# Patient Record
Sex: MALE | Race: WHITE
[De-identification: names, ages, dates, MRNs, and addresses within clinical notes are randomized per-mention and may not be internally consistent; named-entity substitution may affect disease eponyms.]

---

## 2018-04-09 ENCOUNTER — HOSPITAL ENCOUNTER (INPATIENT)
Dept: HOSPITAL 74 - JER | Age: 78
LOS: 14 days | Discharge: HOME | DRG: 321 | End: 2018-04-23
Attending: FAMILY MEDICINE | Admitting: INTERNAL MEDICINE
Payer: MEDICARE

## 2018-04-09 VITALS — BODY MASS INDEX: 21.4 KG/M2

## 2018-04-09 DIAGNOSIS — R20.2: ICD-10-CM

## 2018-04-09 DIAGNOSIS — E11.9: ICD-10-CM

## 2018-04-09 DIAGNOSIS — N40.0: ICD-10-CM

## 2018-04-09 DIAGNOSIS — M47.12: Primary | ICD-10-CM

## 2018-04-09 DIAGNOSIS — K59.00: ICD-10-CM

## 2018-04-09 DIAGNOSIS — N39.0: ICD-10-CM

## 2018-04-09 DIAGNOSIS — Z79.84: ICD-10-CM

## 2018-04-09 DIAGNOSIS — G95.89: ICD-10-CM

## 2018-04-09 DIAGNOSIS — R33.8: ICD-10-CM

## 2018-04-09 DIAGNOSIS — G95.20: ICD-10-CM

## 2018-04-09 DIAGNOSIS — F41.9: ICD-10-CM
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
## 2018-04-09 DIAGNOSIS — I10: ICD-10-CM

## 2018-04-09 DIAGNOSIS — N99.89: ICD-10-CM

## 2018-04-09 LAB
ALBUMIN SERPL-MCNC: 4.3 G/DL (ref 3.4–5)
ALP SERPL-CCNC: 73 U/L (ref 45–117)
ALT SERPL-CCNC: 31 U/L (ref 12–78)
ANION GAP SERPL CALC-SCNC: 5 MMOL/L (ref 8–16)
APPEARANCE UR: CLEAR
AST SERPL-CCNC: 15 U/L (ref 15–37)
BASOPHILS # BLD: 0.5 % (ref 0–2)
BILIRUB SERPL-MCNC: 0.3 MG/DL (ref 0.2–1)
BILIRUB UR STRIP.AUTO-MCNC: NEGATIVE MG/DL
BUN SERPL-MCNC: 17 MG/DL (ref 7–18)
CALCIUM SERPL-MCNC: 9 MG/DL (ref 8.5–10.1)
CHLORIDE SERPL-SCNC: 106 MMOL/L (ref 98–107)
CO2 SERPL-SCNC: 26 MMOL/L (ref 21–32)
COLOR UR: YELLOW
CREAT SERPL-MCNC: 0.9 MG/DL (ref 0.7–1.3)
DEPRECATED RDW RBC AUTO: 14.8 % (ref 11.9–15.9)
EOSINOPHIL # BLD: 0.4 % (ref 0–4.5)
GLUCOSE SERPL-MCNC: 169 MG/DL (ref 74–106)
HCT VFR BLD CALC: 41.4 % (ref 35.4–49)
HGB BLD-MCNC: 14.2 GM/DL (ref 11.7–16.9)
INR BLD: 0.92 (ref 0.82–1.09)
KETONES UR QL STRIP: (no result)
LEUKOCYTE ESTERASE UR QL STRIP.AUTO: NEGATIVE
LYMPHOCYTES # BLD: 14.4 % (ref 8–40)
MCH RBC QN AUTO: 30.4 PG (ref 25.7–33.7)
MCHC RBC AUTO-ENTMCNC: 34.3 G/DL (ref 32–35.9)
MCV RBC: 88.5 FL (ref 80–96)
MONOCYTES # BLD AUTO: 5.2 % (ref 3.8–10.2)
MUCOUS THREADS URNS QL MICRO: (no result)
NEUTROPHILS # BLD: 79.5 % (ref 42.8–82.8)
NITRITE UR QL STRIP: NEGATIVE
PH UR: 5 [PH] (ref 5–8)
PLATELET # BLD AUTO: 231 K/MM3 (ref 134–434)
PMV BLD: 8.8 FL (ref 7.5–11.1)
POTASSIUM SERPLBLD-SCNC: 4.3 MMOL/L (ref 3.5–5.1)
PROT SERPL-MCNC: 7.4 G/DL (ref 6.4–8.2)
PROT UR QL STRIP: (no result)
PROT UR QL STRIP: (no result)
PT PNL PPP: 10.4 SEC (ref 9.98–11.88)
RBC # BLD AUTO: 4.68 M/MM3 (ref 4–5.6)
RBC # UR STRIP: NEGATIVE /UL
SODIUM SERPL-SCNC: 137 MMOL/L (ref 136–145)
SP GR UR: 1.03 (ref 1–1.03)
UROBILINOGEN UR STRIP-MCNC: NEGATIVE MG/DL (ref 0.2–1)
WBC # BLD AUTO: 8.3 K/MM3 (ref 4–10)

## 2018-04-09 PROCEDURE — G0378 HOSPITAL OBSERVATION PER HR: HCPCS

## 2018-04-09 NOTE — PDOC
History of Present Illness





<Gavino Weathers - Last Filed: 04/10/18 02:26>





- History of Present Illness


Initial Comments: 





04/09/18 21:46


Pt is a 76 yo M with PMHx of DM, HTN, BPH presenting with two day hx of 

numbness of the LLE from the hip down. 


Pt noticed numbness of the LLE on Saturday around 8pm which resulted in a gait 

imbalance yesterday and he dropping a cup of coffee.


This evening, while in the ED, patient noticed increased numbness of the RLE 

also starting from the hip down. No hx of trauma, no hx of back pain or 

swelling. No hx of cough, but wife has noted recent night sweats and some 

weight loss. Prior to yesterday, pt had been active and ambulating independently

, walking long distances, but had noted intermittent claudication with pain 

over both calves worsened by exertion and relieved for the most part with aleve.


Patient denies urinary or fecal incontinence, but has been having constipation 

with normal colored stool. 


There was no associated aura, seizure, syncope or fall. No fever, palpitations 

SOB. 





04/09/18 21:51


The numbness is descending and not ascending with no prior hx of infections 

suggestive of Guilliane Nobleton. 


Patient described prior burning and questionable pins and needle sensations 

which could be suggestive of diabetic poly-neuropathy, but the numbness appears 

to be sudden and not gradual. We will rule out Vit B12 deficiency and CBC, CMP, 

Vit B12. 


No hx of trauma or back swelling but will go ahead and do an Xray of the lumbar 

vertebrae. No urinary or fecal symptoms and no hx of cancer 


Anterior spinal artery disease could be sudden but motor functions may not be 

preserved. Xray and Ct lumbar spine to R/o spinal stenosis








04/10/18 12:35


Xray lumbar spine, Ct lumbar spine : 


Mild anterior wedging of L3 with anterior fusion of L2 bodies and anterior 

bridging osteophytes,minimal anterolisthesis of L5 over S1, Multilevel mild 

disc bulge slightly impinging Rt L4 nerve rootat L4-L5.


Moderate to marked degenerative central stenosis L5-S1








<Olga Galdamez I - Last Filed: 04/10/18 12:35>





- General


Chief Complaint: CVA/TIA


Stated Complaint: WEAKNESS


Time Seen by Provider: 04/09/18 18:42





Past History





<Gavino Weathers - Last Filed: 04/10/18 02:26>





- Past Medical History


COPD: No


Diabetes: Yes


HTN: Yes





- Suicide/Smoking/Psychosocial Hx


Smoking History: Never smoked


Have you smoked in the past 12 months: No


Information on smoking cessation initiated: No


Hx Alcohol Use: No


Drug/Substance Use Hx: No


Substance Use Type: None





<Olga Galdamez - Last Filed: 04/10/18 12:35>





- Past Medical History


Allergies/Adverse Reactions: 


 Allergies











Allergy/AdvReac Type Severity Reaction Status Date / Time


 


No Known Allergies Allergy   Verified 04/09/18 16:26











Home Medications: 


Ambulatory Orders





Doxazosin Mesylate [Cardura -] 8 mg PO HS 04/10/18 


Lisinopril 5 mg PO DAILY 04/10/18 


Sitagliptin Phos/Metformin HCl [Janumet 50-1,000 mg Tablet] 1 each PO DAILY 04/

10/18 











*Physical Exam





- Vital Signs


 Last Vital Signs











Temp Pulse Resp BP Pulse Ox


 


 98.0 F   91 H  18   190/88   100 


 


 04/09/18 16:27  04/09/18 16:27  04/09/18 16:27  04/09/18 16:27  04/10/18 00:39














<Jasiel,Gavino - Last Filed: 04/10/18 02:26>





- Vital Signs


 





- Physical Exam


General Appearance: Yes: Appropriately Dressed.  No: Apparent Distress


HEENT: positive: EOMI, MARLEN.  negative: Photophobia, Sinus Tenderness, Hearing 

Decreased


Neck: positive: Supple.  negative: Decreased range of motion


Respiratory/Chest: positive: Lungs Clear, Normal Breath Sounds.  negative: 

Rhonchi, Stridor, Wheezing


Cardiovascular: positive: Regular Rate, S1, S2.  negative: Edema


Vascular Pulses: Dorsalis-Pedis (R): 2+, Doralis-Pedis (L): 2+


Gastrointestinal/Abdominal: positive: Normal Bowel Sounds, Soft.  negative: 

Tender, Tenderness


Musculoskeletal: negative: CVA Tenderness, Vertebral Tenderness


Extremity: positive: Normal Capillary Refill, Normal Inspection.  negative: 

Tender, Coldness, Swelling


Integumentary: positive: Dry, Warm.  negative: Cyanotic


Neurologic: positive: Fully Oriented, Alert, Normal Mood/Affect, Motor Strength 

5/5 (Bilateral lower extremities), Numbness, Sensory Deficit (More on LLE ).  

negative: Babinski


Deep Tendon Reflexes: Ankle (L): 2+, Ankle (R): 2+, Knee (L): 4+ (Hyperreflexia)

, Knee (R): 4+ (Hyperreflexia), Bicep (L): 2+, Bicep (R): 2+, Tricep (L): 2+, 

Tricep (R): 2+





<Olga Galdamez I - Last Filed: 04/10/18 12:35>





ED Treatment Course





- LABORATORY


CBC & Chemistry Diagram: 


 04/09/18 19:15





 04/09/18 19:15





- ADDITIONAL ORDERS


Additional order review: 


 Laboratory  Results











  04/09/18 04/09/18 04/09/18





  19:15 19:15 18:38


 


PT with INR   10.40 


 


INR   0.92 


 


Sodium  137  


 


Potassium  4.3  


 


Chloride  106  


 


Carbon Dioxide  26  


 


Anion Gap  5 L  


 


BUN  17  


 


Creatinine  0.9  


 


Creat Clearance w eGFR  > 60  


 


Random Glucose  169 H  


 


Calcium  9.0  


 


Total Bilirubin  0.3  


 


AST  15  


 


ALT  31  


 


Alkaline Phosphatase  73  


 


Total Protein  7.4  


 


Albumin  4.3  


 


Urine Color    Yellow


 


Urine Appearance    Clear


 


Urine pH    5.0


 


Ur Specific Gravity    1.026


 


Urine Protein    1+ H


 


Urine Glucose (UA)    1+ H


 


Urine Ketones    Trace H


 


Urine Blood    Negative


 


Urine Nitrite    Negative


 


Urine Bilirubin    Negative


 


Urine Urobilinogen    Negative


 


Ur Leukocyte Esterase    Negative


 


Urine WBC (Auto)    <1


 


Urine RBC (Auto)    <1


 


Urine Mucus    Rare








 











  04/09/18





  19:15


 


RBC  4.68


 


MCV  88.5


 


MCHC  34.3


 


RDW  14.8


 


MPV  8.8


 


Neutrophils %  79.5


 


Lymphocytes %  14.4


 


Monocytes %  5.2


 


Eosinophils %  0.4


 


Basophils %  0.5














- RADIOLOGY


Radiology Studies Ordered: 














 Category Date Time Status


 


 LUMBAR SPINE CT W/O CONTRAST [CT] Stat CT Scan  04/10/18 02:24 Ordered














<Gavino Weathers - Last Filed: 04/10/18 02:26>





- LABORATORY


CBC & Chemistry Diagram: 


 04/09/18 19:15





 04/09/18 19:15





- ADDITIONAL ORDERS


Additional order review: 


 Laboratory  Results











  04/09/18 04/09/18 04/09/18





  19:15 19:15 18:38


 


PT with INR   10.40 


 


INR   0.92 


 


Sodium  137  


 


Potassium  4.3  


 


Chloride  106  


 


Carbon Dioxide  26  


 


Anion Gap  5 L  


 


BUN  17  


 


Creatinine  0.9  


 


Creat Clearance w eGFR  > 60  


 


Random Glucose  169 H  


 


Calcium  9.0  


 


Total Bilirubin  0.3  


 


AST  15  


 


ALT  31  


 


Alkaline Phosphatase  73  


 


Total Protein  7.4  


 


Albumin  4.3  


 


Urine Color    Yellow


 


Urine Appearance    Clear


 


Urine pH    5.0


 


Ur Specific Gravity    1.026


 


Urine Protein    1+ H


 


Urine Glucose (UA)    1+ H


 


Urine Ketones    Trace H


 


Urine Blood    Negative


 


Urine Nitrite    Negative


 


Urine Bilirubin    Negative


 


Urine Urobilinogen    Negative


 


Ur Leukocyte Esterase    Negative


 


Urine WBC (Auto)    <1


 


Urine RBC (Auto)    <1


 


Urine Mucus    Rare








 











  04/09/18





  19:15


 


RBC  4.68


 


MCV  88.5


 


MCHC  34.3


 


RDW  14.8


 


MPV  8.8


 


Neutrophils %  79.5


 


Lymphocytes %  14.4


 


Monocytes %  5.2


 


Eosinophils %  0.4


 


Basophils %  0.5














<Olga Galdamez I - Last Filed: 04/10/18 12:35>





*DC/Admit/Observation/Transfer





- Discharge Dispostion


Admit: Yes





<Gavino Weathers - Last Filed: 04/10/18 02:26>





<Olga Galdamez - Last Filed: 04/10/18 12:35>


Diagnosis at time of Disposition: 


 Bilateral leg paresthesia, Spinal stenosis








- Discharge Dispostion


Condition at time of disposition: Stable

## 2018-04-09 NOTE — PDOC
Attending Attestation





- Resident


Resident Name: DeniceOlga I





- ED Attending Attestation


I have performed the following: I have examined & evaluated the patient, The 

case was reviewed & discussed with the resident, I agree w/resident's findings 

& plan, Exceptions are as noted





- HPI


HPI: 





04/09/18 22:00


77y M hx dm, htn, bph, presents with 2 days leg weakness. Pt states ender ton 

saturday night, he had sudden onset of tingling/parasthesias in the LLE, with 

some in the RLE, and worsened the past 2 day where he can barely ambulate. Th 

ept denies any focal weakness, headache, dizziness, back pain, trauma, visoin 

changes, upper extermitie symptoms, fever/chills, urinary/bowel incontinence, 

recent illness/uris. He denies any chest pain, palpitations, lightheadedness. 





On exam the patient is well-appearing, in no acute distress,


GENERAL: The patient is awake, alert, and fully oriented, Nontoxic - in no 

acute distress.


HEAD: Normocephalic, atraumatic.


EYES: extraocular movements intact, sclera anicteric, conjunctiva clear.


ENT: Normal voice,  Moist mucous membranes.


NECK: Normal range of motion, supple


LUNGS: Breath sounds equal, clear to auscultation bilaterally.  No wheezes, no 

rhonchi, no rales.


HEART: Regular rate and rhythm, normal S1 and S2 without murmur, rub or gallop.


ABDOMEN: Soft, nontender, normoactive bowel sounds.  No guarding, no rebound.  

. No CVA tenderness


EXTREMITIES: Normal range of motion, no edema.  No clubbing or cyanosis. No 

cords, erythema, or tenderness.


NEUROLOGICAL: No facial assymetry, Normal speech, 


PSYCH: Normal mood, normal affect.


SKIN: Warm, Dry, normal turgor,





NEURO:


Mental status: The patient is oriented x3.


Cranial nerves: Cranial nerves II through XII are intact


Motor: Strength in the upper and lower extremities are symmetric and intact, 5/5


Sensation: Sensation is deminished in the lower extremities to light touch  +

romberg negative


Cerebellar: Finger-finger-nose is normal in both upper extremities. rapid 

alternating movements are normal.


Reflexes: hyperreflex patellar reflexes


Gait: unstable gait





ddx - cva, canal steonsis/nerve impingement, 


no back pain or history of trauma


will ck ct head, lumbar xray


enzo ck labs


will reassess














- Physicial Exam


PE: 





04/10/18 02:09


see above








- Medical Decision Making





04/10/18 02:08


labs unremarkble


ct head negative for acute patholgoy 


Lumbar spine xray reviewed - will obtain LS CT


will observe for further mangament and neuro consultation due to difficulty 

ambuliating and parasthesias





PMD is dr. Barrie hess - will admit to hospitalist service overnight





case dw dr. camacho


agreed with admission for furthe anagement





Case discussed in detail with admitting physician including history, physical 

exam and ancillary studies.


Admitting physician has assumed care for the patient, will follow all pending 

diagnostics and will complete the evaluation and treatment. 











**Heart Score/ECG Review





- ECG Impressions


Comment:: 





04/10/18 00:23


Twelve-lead EKG was performed and reviewed by me. 


There is normal sinus rhythm with a normal rate. 


Rate of 75


The axis is normal. 


The intervals are normal. 


There is normal R wave progression


There are no ST or T wave abnormalities.

## 2018-04-09 NOTE — PDOC
History of Present Illness





- General


Chief Complaint: CVA/TIA


Stated Complaint: WEAKNESS


Time Seen by Provider: 04/09/18 18:42





- History of Present Illness


Initial Comments: 





04/09/18 21:44


The patient is a 78 yo M with PMHx of DM, HTN, BPH








Past History





- Past Medical History


Allergies/Adverse Reactions: 


 Allergies











Allergy/AdvReac Type Severity Reaction Status Date / Time


 


No Known Allergies Allergy   Verified 04/09/18 16:26











COPD: No


Diabetes: Yes


HTN: Yes





- Suicide/Smoking/Psychosocial Hx


Smoking History: Never smoked


Have you smoked in the past 12 months: No


Information on smoking cessation initiated: No


Hx Alcohol Use: No


Drug/Substance Use Hx: No


Substance Use Type: None





*Physical Exam





- Vital Signs


 Last Vital Signs











Temp Pulse Resp BP Pulse Ox


 


 98.0 F   91 H  18   190/88   100 


 


 04/09/18 16:27  04/09/18 16:27  04/09/18 16:27  04/09/18 16:27  04/09/18 16:27














**Discharge Disposition





- Referrals


Referrals: 


Bassam Eric MD [Primary Care Provider] - 





- Patient Instructions





- Post Discharge Activity

## 2018-04-10 RX ADMIN — INSULIN ASPART SCH: 100 INJECTION, SOLUTION INTRAVENOUS; SUBCUTANEOUS at 06:35

## 2018-04-10 RX ADMIN — LISINOPRIL SCH MG: 5 TABLET ORAL at 09:14

## 2018-04-10 RX ADMIN — HEPARIN SODIUM SCH UNIT: 5000 INJECTION, SOLUTION INTRAVENOUS; SUBCUTANEOUS at 21:38

## 2018-04-10 RX ADMIN — DOXAZOSIN MESYLATE SCH MG: 4 TABLET ORAL at 22:25

## 2018-04-10 RX ADMIN — INSULIN ASPART SCH UNITS: 100 INJECTION, SOLUTION INTRAVENOUS; SUBCUTANEOUS at 12:00

## 2018-04-10 RX ADMIN — INSULIN ASPART SCH: 100 INJECTION, SOLUTION INTRAVENOUS; SUBCUTANEOUS at 16:45

## 2018-04-10 RX ADMIN — INSULIN ASPART SCH: 100 INJECTION, SOLUTION INTRAVENOUS; SUBCUTANEOUS at 21:38

## 2018-04-10 RX ADMIN — POLYETHYLENE GLYCOL 3350 SCH GM: 17 POWDER, FOR SOLUTION ORAL at 09:14

## 2018-04-10 NOTE — CON.NEURO
Consult





- Alcohol/Substance Use


Hx Alcohol Use: No





- Smoking History


Smoking history: Never smoked


Have you smoked in the past 12 months: No





Home Medications





- Allergies


Allergies/Adverse Reactions: 


 Allergies











Allergy/AdvReac Type Severity Reaction Status Date / Time


 


No Known Allergies Allergy   Verified 18 16:26














- Home Medications


Home Medications: 


Ambulatory Orders





Doxazosin Mesylate [Cardura -] 8 mg PO HS 04/10/18 


Lisinopril 5 mg PO DAILY 04/10/18 


Sitagliptin Phos/Metformin HCl [Janumet 50-1,000 mg Tablet] 1 each PO DAILY 04/

10/18 











Physical Exam-Neuro


Vital Signs: 


 Vital Signs











Temperature  97.7 F   04/10/18 07:50


 


Pulse Rate  80   04/10/18 11:00


 


Respiratory Rate  20   04/10/18 11:00


 


Blood Pressure  164/86   04/10/18 11:00


 


O2 Sat by Pulse Oximetry (%)  98   04/10/18 07:17











Labs: 


 CBC, BMP





 18 19:15 





 18 19:15 





 INR, PTT











INR  0.92  (0.82-1.09)   18  19:15    














Assessment/Plan


cc complaining of numbness , weakness in leg and pain in legs


HPI 77 year old male , History of DM, HTN BPH complaining of numbness in lower 

extremity for three days. Before saturday he was fine. He denies any 

significant bakc pain. He denies any trauma, fever or cancer.He has been 

constipated but no incontinence.


Patient has ct of l spine done and it shwed DJD, ct head is normal.


Past Medical History 


HTN, DM, BPH


No surgical History


Denies toxic habits, lives with wife, retired


NKDA





Family History:


mother  age 75, gastroenteritis/dehydration


father  age 75, CVA


brother  age 65, comps of DM





HOME MEDICATIONS:


 3





 Medication  Instructions  Recorded


 


Doxazosin Mesylate [Cardura -] 8 mg PO HS 04/10/18


 


Lisinopril 5 mg PO DAILY 04/10/18


 


Sitagliptin Phos/Metformin HCl 1 each PO DAILY 04/10/18





[Janumet 50-1,000 mg Tablet]  











Neurological Examination





Alert oriented x 3, speech is normal, 


eomi, pupils is reactive, and no face asymmetry


there is normal strength, 


Lower extremity, Bilateral hip flexor, extensor, knee flexor and extensor, 

ankle dorsiflexion and extension are normal





There is hyper reflexia in lower extremity upto grade 3


planter is withdrawal


sensation is diminished to fine touch and pinprick diminished upto t 10 level





There is tenderness distally in his feet, no swelling were seen








Assessment- Given he has diminished sensation upto t 10-12, and hyper reflexia, 

I suggest to do mri of l and t spine, and do neuropathy work up





Plan- b12,folate tsh , seruk protein electroporesis,  esr and mojgan


mri of L and T spine


PT


will follow up after the test


Thanking you so much


Yogesh Rosas MD

## 2018-04-10 NOTE — EKG
Test Reason : 

Blood Pressure : ***/*** mmHG

Vent. Rate : 075 BPM     Atrial Rate : 075 BPM

   P-R Int : 132 ms          QRS Dur : 112 ms

    QT Int : 350 ms       P-R-T Axes : 063 071 071 degrees

   QTc Int : 390 ms

 

NORMAL SINUS RHYTHM

POSSIBLE LEFT ATRIAL ENLARGEMENT

BORDERLINE ECG

NO PREVIOUS ECGS AVAILABLE

Confirmed by MD Ward, Chance ([id number]) on 4/10/2018 1:35:04 PM

 

Referred By:             Confirmed By:Chance Hopper MD

## 2018-04-10 NOTE — HP
CHIEF COMPLAINT: leg numbness





PCP: CHARISMA Eric





HISTORY OF PRESENT ILLNESS:


This is a 77 year old male with a past medical history of DM, HTN, BPH 

presented to the ED with sudden onset left leg numbness 2 days ago and right 

leg numbness since sitting in the ED this evening. He has difficulty walking 

due to weakness. Prior to Saturday he was feeling fine. Denies bowel or bladder 

incontinence. Reports chronic constipation





ER course was notable for:


(1) CT head with no acute findings


(2) CT Lumbar spine with multiple bulging discs and facet joint arthrosis with 

mild B/L neural foraminal narrowing L3-4, L4-L5


(3) labs WNL





Recent Travel:


pt denies





PAST MEDICAL HISTORY:


HTN, DM, BPH


PAST SURGICAL HISTORY:


pt denies





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 




Smoking: pt denies


Alcohol: pt denies


Drugs:  pt denies





Family History:


mother  age 75, gastroenteritis/dehydration


father  age 75, CVA


brother  age 65, comps of DM





Allergies


No Known Allergies Allergy (Verified 18 16:26)





HOME MEDICATIONS:


 3





 Medication  Instructions  Recorded


 


Doxazosin Mesylate [Cardura -] 8 mg PO HS 04/10/18


 


Lisinopril 5 mg PO DAILY 04/10/18


 


Sitagliptin Phos/Metformin HCl 1 each PO DAILY 04/10/18





[Janumet 50-1,000 mg Tablet]  








REVIEW OF SYSTEMS


CONSTITUTIONAL: 


Absent:  fever, chills, diaphoresis, generalized weakness, malaise, loss of 

appetite, weight change


HEENT: 


Absent:  rhinorrhea, nasal congestion, throat pain, throat swelling, difficulty 

swallowing, mouth swelling, ear pain, eye pain, visual changes


CARDIOVASCULAR: 


Absent: chest pain, syncope, palpitations, irregular heart rate, lightheadedness

, peripheral edema


RESPIRATORY: 


Absent: cough, shortness of breath, dyspnea with exertion, orthopnea, wheezing, 

stridor, hemoptysis


GASTROINTESTINAL:


Absent: abdominal pain, abdominal distension, nausea, vomiting, diarrhea, 

constipation, melena, hematochezia


GENITOURINARY: 


Absent: dysuria, frequency, urgency, hesitancy, hematuria, flank pain, genital 

pain


MUSCULOSKELETAL: 


Absent: myalgia, arthralgia, joint swelling, back pain, neck pain


SKIN: 


Absent: rash, itching, pallor


HEMATOLOGIC/IMMUNOLOGIC: 


Absent: easy bleeding, easy bruising, lymphadenopathy, frequent infections


ENDOCRINE:


Absent: unexplained weight gain, unexplained weight loss, heat intolerance, 

cold intolerance


NEUROLOGIC: Present: focal weakness or paresthesias


Absent: headache, dizziness, unsteady gait, seizure, mental status changes, 

bladder or bowel incontinence


PSYCHIATRIC: 


Absent: anxiety, depression, suicidal or homicidal ideation, hallucinations.








PHYSICAL EXAMINATION


Vital Signs - 24 hr





 3





  04/09/18 04/10/18





  16:27 00:39


 


Temperature 98.0 F 


 


Pulse Rate 91 H 


 


Respiratory 18 





Rate  


 


Blood Pressure 190/88 


 


O2 Sat by Pulse 100 100





Oximetry (%)  








GENERAL: Awake, alert, and fully oriented, in no acute distress.


HEAD: Normal with no signs of trauma.


EYES: Pupils equal, round and reactive to light, extraocular movements intact, 

sclera anicteric, conjunctiva clear. No lid lag.


EARS, NOSE, THROAT: Ears normal, nares patent, oropharynx clear without 

exudates. Moist mucous membranes.


NECK: Normal range of motion, supple without lymphadenopathy, JVD, or masses.


LUNGS: Breath sounds equal, clear to auscultation bilaterally. No wheezes, and 

no crackles. No accessory muscle use.


HEART: Regular rate and rhythm, normal S1 and S2 without murmur, rub or gallop.


ABDOMEN: Soft, nontender, not distended, normoactive bowel sounds, no guarding, 

no rebound, no masses.  No hepatomegaly or  splenomegaly. 


MUSCULOSKELETAL: Normal range of motion at all joints. No bony deformities or 

tenderness. No CVA tenderness.


UPPER EXTREMITIES: 2+ pulses, warm, well-perfused. No cyanosis. No clubbing. No 

peripheral edema.


LOWER EXTREMITIES: 2+ pulses, warm, well-perfused. No calf tenderness. No 

peripheral edema. 


NEUROLOGICAL:  Cranial nerves II-XII intact. Normal speech. upper extremities 

WNL, finger to nose WNL, strength 5/5. bilat lower extremities noted with 

spasticity, hyperreflexia, severe ataxia on heel shin. Sharp sensation grossly 

intact, diminished light touch sensation from thighs down.


PSYCHIATRIC: Cooperative. Good eye contact. Appropriate mood and affect.


SKIN: Warm, dry, normal turgor, no rashes or lesions noted, normal capillary 

refill. 


 


Laboratory Results - last 24 hr





 3





  18





  18:38 19:15 19:15


 


WBC   8.3 


 


RBC   4.68 


 


Hgb   14.2 


 


Hct   41.4 


 


MCV   88.5 


 


MCH   30.4 


 


MCHC   34.3 


 


RDW   14.8 


 


Plt Count   231 


 


MPV   8.8 


 


Neutrophils %   79.5 


 


Lymphocytes %   14.4 


 


Monocytes %   5.2 


 


Eosinophils %   0.4 


 


Basophils %   0.5 


 


PT with INR    10.40


 


INR    0.92


 


Sodium   


 


Potassium   


 


Chloride   


 


Carbon Dioxide   


 


Anion Gap   


 


BUN   


 


Creatinine   


 


Creat Clearance w eGFR   


 


Random Glucose   


 


Calcium   


 


Total Bilirubin   


 


AST   


 


ALT   


 


Alkaline Phosphatase   


 


Total Protein   


 


Albumin   


 


Vitamin B12   


 


Urine Color  Yellow  


 


Urine Appearance  Clear  


 


Urine pH  5.0  


 


Ur Specific Gravity  1.026  


 


Urine Protein  1+ H  


 


Urine Glucose (UA)  1+ H  


 


Urine Ketones  Trace H  


 


Urine Blood  Negative  


 


Urine Nitrite  Negative  


 


Urine Bilirubin  Negative  


 


Urine Urobilinogen  Negative  


 


Ur Leukocyte Esterase  Negative  


 


Urine WBC (Auto)  <1  


 


Urine RBC (Auto)  <1  


 


Urine Mucus  Rare  








 3





  18 04/10/18





  19:15 00:00


 


WBC  


 


RBC  


 


Hgb  


 


Hct  


 


MCV  


 


MCH  


 


MCHC  


 


RDW  


 


Plt Count  


 


MPV  


 


Neutrophils %  


 


Lymphocytes %  


 


Monocytes %  


 


Eosinophils %  


 


Basophils %  


 


PT with INR  


 


INR  


 


Sodium  137 


 


Potassium  4.3 


 


Chloride  106 


 


Carbon Dioxide  26 


 


Anion Gap  5 L 


 


BUN  17 


 


Creatinine  0.9 


 


Creat Clearance w eGFR  > 60 


 


Random Glucose  169 H 


 


Calcium  9.0 


 


Total Bilirubin  0.3 


 


AST  15 


 


ALT  31 


 


Alkaline Phosphatase  73 


 


Total Protein  7.4 


 


Albumin  4.3 


 


Vitamin B12   529


 


Urine Color  


 


Urine Appearance  


 


Urine pH  


 


Ur Specific Gravity  


 


Urine Protein  


 


Urine Glucose (UA)  


 


Urine Ketones  


 


Urine Blood  


 


Urine Nitrite  


 


Urine Bilirubin  


 


Urine Urobilinogen  


 


Ur Leukocyte Esterase  


 


Urine WBC (Auto)  


 


Urine RBC (Auto)  


 


Urine Mucus  








ECG 


normal sinus rhythm


vent rate 75, 


no acute ST/T wave changes





Radiology Reports


CT head non contrast


THIS IS A PRELIMINARY REPORT FROM IMAGING ON CALL


IMPRESSION: No evidence of pathology.


THIS DOCUMENT HAS BEEN ELECTRONICALLY SIGNED


Keon Chris MD


04/10/2018 01:48 EST





CT LS spine


THIS IS A PRELIMINARY REPORT FROM IMAGING ON CALL


IMPRESSION: Multilevel moderate degenerative changes with mild bilateral neural 

foraminal


narrowing at the L3-4 and L4-5 levels.


THIS DOCUMENT HAS BEEN ELECTRONICALLY SIGNED


Keon Chris MD


04/10/2018 03:05 EST








ASSESSMENT/PLAN:


77yM with PMH HTN, DM, BPH presented to the ED with B/L LE numbness, weakness 

and spasticity.





Ataxia, B/L LE parasthesias, r/o Upper motor neuron disorder


   - LS spine CT unimpressive given severity of findings on PE


   - Neuro consult


   - would consider MRI thoracic and lumbar spine.





constipation


   - large amount of fecal matter visible on L spine xray


   - start miralax, monitor efficacy.





HTN


   - cont home lisinopril, monitor BP and adjust accordingly





DM


   - home janumet held


   - BGM AC/HS with novolog SS


   - diabetic diet





BPH


   - cont home cardura





DVT PPX


   - heparin deferred for now, anticipated LOS <48h, reassess if stays longer





FEN


   - tolerating po


   - BMP in am 4/11


   - low sodium/diabetic diet as tolerated





Dispo: Pt currently requires further observation and management of his emergent 

condition.








Visit type





- Emergency Visit


Emergency Visit: Yes


ED Registration Date: 04/10/18


Care time: The patient presented to the Emergency Department on the above date 

and was hospitalized for further evaluation of their emergent condition.





- New Patient


This patient is new to me today: Yes


Date on this admission: 04/10/18





- Critical Care


Critical Care patient: No





Hospitalist Screening





- Colonoscopy Questionnaire


Colonoscopy Questionnaire: 





Colonoscopy Questionnaire








-   Patient:


50 - 75 years old and never had a screening colonoscopy: No


History of colon or rectal polyps, or CA: No


History of IBD, Crohn's disease or UC: No


History of abdominal radiation therapy as a child: No





-   Relative:


1 with colon or rectal CA, or polyps at age 60 or younger: No


Colon or rectal CA diagnosed at age 45 or younger: No


Multiple relatives with colon or rectal CA: No





-   Outcome:


Screening Result: Negative Screen

## 2018-04-10 NOTE — PN
Progress Note, Physician


Chief Complaint: 





BLLE weakness


History of Present Illness: 





NAD, in bed, family at bedside





- Current Medication List


Current Medications: 


Active Medications





Doxazosin Mesylate (Cardura -)  8 mg PO HS [facility]


Insulin Aspart (Novolog Vial Sliding Scale -)  0 vial SQ HS [facility]


   PRN Reason: Protocol


Insulin Aspart (Novolog Vial Sliding Scale -)  0 vial SQ TIDAC [facility]


   PRN Reason: Protocol


   Last Admin: 04/10/18 06:35 Dose:  Not Given


Lisinopril (Prinivil)  5 mg PO DAILY [facility]


   Last Admin: 04/10/18 09:14 Dose:  5 mg


Metformin HCl (Glucophage -)  1,000 mg PO DAILY@0700 [facility]


   Last Admin: 04/10/18 06:35 Dose:  1,000 mg


Polyethylene Glycol (Miralax (For Daily Use) -)  17 gm PO DAILY [facility]


   Last Admin: 04/10/18 09:14 Dose:  17 gm


Sitagliptin Phosphate (Januvia -)  50 mg PO DAILY@0700 [facility]


   Last Admin: 04/10/18 06:35 Dose:  50 mg











- Objective


Vital Signs: 


 Vital Signs











Temperature  97.7 F   04/10/18 07:50


 


Pulse Rate  82   04/10/18 07:50


 


Respiratory Rate  18   04/10/18 07:50


 


Blood Pressure  149/87   04/10/18 07:50


 


O2 Sat by Pulse Oximetry (%)  98   04/10/18 07:17











Constitutional: Yes: Well Nourished, No Distress, Calm


Cardiovascular: Yes: Regular Rate and Rhythm


Respiratory: Yes: Regular


Gastrointestinal: Yes: Normal Bowel Sounds, Soft


Musculoskeletal: Yes: Muscle Weakness (BLLE)


Edema: No


Peripheral Pulses WNL: Yes


Neurological: Yes: Alert, Oriented, Numbness (BLLE), Tingling (BLLE), Weakness


Psychiatric: Yes: Alert, Oriented


Labs: 


 CBC, BMP





 04/09/18 19:15 





 04/09/18 19:15 





 INR, PTT











INR  0.92  (0.82-1.09)   04/09/18  19:15    














Problem List





- Problems


(1) Diabetes


Assessment/Plan: 


-diabetic diet


-hold metformin during hospital stay


-continue Januvia


-BGM


-A1c at 8.0


Code(s): E11.9 - TYPE 2 DIABETES MELLITUS WITHOUT COMPLICATIONS   





(2) Bilateral leg paresthesia


Assessment/Plan: 


-neurology consult


-CT lumbar spine


-MRI Spine ?


-Physical therapy


-check folate, esr, mojgan,


-B12 and TSH normal


Code(s): R20.2 - PARESTHESIA OF SKIN   





Assessment/Plan





see problem list


DVT prophylaxis

## 2018-04-11 LAB
ALBUMIN SERPL-MCNC: 3.8 G/DL (ref 3.4–5)
ALP SERPL-CCNC: 61 U/L (ref 45–117)
ALT SERPL-CCNC: 23 U/L (ref 12–78)
ANION GAP SERPL CALC-SCNC: 10 MMOL/L (ref 8–16)
AST SERPL-CCNC: 17 U/L (ref 15–37)
BASOPHILS # BLD: 0.7 % (ref 0–2)
BILIRUB SERPL-MCNC: 0.8 MG/DL (ref 0.2–1)
BUN SERPL-MCNC: 15 MG/DL (ref 7–18)
CALCIUM SERPL-MCNC: 9.1 MG/DL (ref 8.5–10.1)
CHLORIDE SERPL-SCNC: 103 MMOL/L (ref 98–107)
CO2 SERPL-SCNC: 25 MMOL/L (ref 21–32)
CREAT SERPL-MCNC: 0.8 MG/DL (ref 0.7–1.3)
DEPRECATED RDW RBC AUTO: 14.9 % (ref 11.9–15.9)
EOSINOPHIL # BLD: 1.4 % (ref 0–4.5)
GLUCOSE SERPL-MCNC: 136 MG/DL (ref 74–106)
HCT VFR BLD CALC: 42.1 % (ref 35.4–49)
HGB BLD-MCNC: 14.4 GM/DL (ref 11.7–16.9)
LYMPHOCYTES # BLD: 27.6 % (ref 8–40)
MAGNESIUM SERPL-MCNC: 2 MG/DL (ref 1.8–2.4)
MCH RBC QN AUTO: 30.2 PG (ref 25.7–33.7)
MCHC RBC AUTO-ENTMCNC: 34.1 G/DL (ref 32–35.9)
MCV RBC: 88.4 FL (ref 80–96)
MONOCYTES # BLD AUTO: 11 % (ref 3.8–10.2)
NEUTROPHILS # BLD: 59.3 % (ref 42.8–82.8)
PHOSPHATE SERPL-MCNC: 3 MG/DL (ref 2.5–4.9)
PLATELET # BLD AUTO: 223 K/MM3 (ref 134–434)
PMV BLD: 8.9 FL (ref 7.5–11.1)
POTASSIUM SERPLBLD-SCNC: 4.2 MMOL/L (ref 3.5–5.1)
PROT SERPL-MCNC: 7 G/DL (ref 6.4–8.2)
RBC # BLD AUTO: 4.77 M/MM3 (ref 4–5.6)
SODIUM SERPL-SCNC: 138 MMOL/L (ref 136–145)
WBC # BLD AUTO: 5.2 K/MM3 (ref 4–10)

## 2018-04-11 RX ADMIN — HEPARIN SODIUM SCH UNIT: 5000 INJECTION, SOLUTION INTRAVENOUS; SUBCUTANEOUS at 21:16

## 2018-04-11 RX ADMIN — DOXAZOSIN MESYLATE SCH MG: 4 TABLET ORAL at 21:13

## 2018-04-11 RX ADMIN — LISINOPRIL SCH MG: 5 TABLET ORAL at 09:40

## 2018-04-11 RX ADMIN — INSULIN ASPART SCH: 100 INJECTION, SOLUTION INTRAVENOUS; SUBCUTANEOUS at 21:13

## 2018-04-11 RX ADMIN — POLYETHYLENE GLYCOL 3350 SCH GM: 17 POWDER, FOR SOLUTION ORAL at 09:41

## 2018-04-11 RX ADMIN — INSULIN ASPART SCH: 100 INJECTION, SOLUTION INTRAVENOUS; SUBCUTANEOUS at 06:18

## 2018-04-11 RX ADMIN — INSULIN ASPART SCH UNITS: 100 INJECTION, SOLUTION INTRAVENOUS; SUBCUTANEOUS at 16:58

## 2018-04-11 RX ADMIN — HEPARIN SODIUM SCH UNIT: 5000 INJECTION, SOLUTION INTRAVENOUS; SUBCUTANEOUS at 09:40

## 2018-04-11 RX ADMIN — INSULIN ASPART SCH: 100 INJECTION, SOLUTION INTRAVENOUS; SUBCUTANEOUS at 11:47

## 2018-04-11 NOTE — PN
Progress Note (short form)





- Note


Progress Note: 





77 year old male , History of DM, HTN BPH complaining of numbness in lower 

extremity for three days. Before saturday he was fine. He denies any 

significant back pain. He denies any trauma, fever or cancer.He has been 

constipated but no incontinence.


Patient has ct of l spine done and it shwed DJD, ct head is normal. He is 

waiting for mri of T/L Spine. EMG was ordered by primary team.


Past Medical History 


HTN, DM, BPH





Neurological Examination





Alert oriented x 3, speech is normal, 


eomi, pupils is reactive, and no face asymmetry


there is normal strength, 


Lower extremity, Bilateral hip flexor, extensor, knee flexor and extensor, 

ankle dorsiflexion and extension are normal





There is hyper reflexia in lower extremity upto grade 3


planter is withdrawal


sensation is diminished to fine touch and pinprick diminished upto t 10 level


remain unchanged 














Assessment- Given he has diminished sensation upto t 10-12, and hyper reflexia, 

I suggest to do mri of l and t spine to rule out cord comression, and 

Neuropathy work up has been b





Plan- Neuropathy work up pending, MRI of T/L spine pending





PT


will follow up after the test


Thanking you so much


Yogesh Rosas MD

## 2018-04-11 NOTE — PN
Progress Note, Physician


History of Present Illness: 





c/o weakness and numbness of legs





- Current Medication List


Current Medications: 


Active Medications





Doxazosin Mesylate (Cardura -)  8 mg PO HS [facility]


   Last Admin: 04/10/18 22:25 Dose:  8 mg


Heparin Sodium (Porcine) (Heparin -)  5,000 unit SQ BID [facility]


   Last Admin: 04/10/18 21:38 Dose:  5,000 unit


Insulin Aspart (Novolog Vial Sliding Scale -)  0 vial SQ HS [facility]


   PRN Reason: Protocol


   Last Admin: 04/10/18 21:38 Dose:  Not Given


Insulin Aspart (Novolog Vial Sliding Scale -)  0 vial SQ TIDAC [facility]


   PRN Reason: Protocol


   Last Admin: 04/11/18 06:18 Dose:  Not Given


Lisinopril (Prinivil)  5 mg PO DAILY [facility]


   Last Admin: 04/10/18 09:14 Dose:  5 mg


Polyethylene Glycol (Miralax (For Daily Use) -)  17 gm PO DAILY [facility]


   Last Admin: 04/10/18 09:14 Dose:  17 gm


Sitagliptin Phosphate (Januvia -)  50 mg PO DAILY@0700 [facility]


   Last Admin: 04/11/18 06:18 Dose:  50 mg











- Objective


Vital Signs: 


 Vital Signs











Temperature  99.0 F   04/11/18 05:55


 


Pulse Rate  83   04/11/18 05:55


 


Respiratory Rate  21   04/11/18 05:55


 


Blood Pressure  123/70   04/11/18 05:55


 


O2 Sat by Pulse Oximetry (%)  96   04/11/18 00:16











Cardiovascular: Yes: Regular Rate and Rhythm


Respiratory: Yes: Regular


Gastrointestinal: Yes: Normal Bowel Sounds


Edema: No


Neurological: Yes: Alert, Oriented, Numbness, Weakness


Labs: 


 CBC, BMP





 04/09/18 19:15 





 04/09/18 19:15 





 INR, PTT











INR  0.92  (0.82-1.09)   04/09/18  19:15    














Problem List





- Problems


(1) Bilateral leg paresthesia


Assessment/Plan: 


-Neuro consult noted


-r/o spinal lesion


-mRI


_emg


-pt eval


-dvt prophylaxis


Code(s): R20.2 - PARESTHESIA OF SKIN   





(2) Diabetes


Assessment/Plan: 


-dems


-bgm


Code(s): E11.9 - TYPE 2 DIABETES MELLITUS WITHOUT COMPLICATIONS

## 2018-04-11 NOTE — CONS
DATE OF CONSULTATION:  04/11/2018

 

DATE OF EVALUATION, ELECTRODIAGNOSTIC STUDIES:  04/11/2018

 

PHYSICAL MEDICINE REHABILITATION & ELECTRODIAGNOSTIC CONSULTATION

 

HISTORY OF PRESENT ILLNESS:  The patient is a 77-year-old man with past medical

history of diabetes who developed left and right lower extremity numbness as well as

difficulty walking.  Patient had no bowel, bladder incontinence.  He underwent CT of

the head which showed mild volume loss and ventricular dilatation without any acute

intracranial pathology.  CT and x-rays were also done on the lumbar spine which

showed degenerative changes.  The patient was evaluated by neurosurgery and

neurology.  Per the CAT scan, there was some central canal stenosis at L5-S1, but on

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

and is pending further imaging studies.  Patient was also referred for

electrodiagnostic evaluation of the lower extremities.  The patient was seen by

physical therapy, able to ambulate 30 feet but required assistance, and was notably

unsteady.  No difficulty with use of the upper extremities.  

 

REVIEW OF PAST MEDICAL AND SURGICAL HISTORY:  Hypertension, diabetes, benign

prostatic hypertrophy.  No surgery.

 

SOCIAL HISTORY:  Lives in an apartment with a lot of stairs to negotiate.  Lives with

his wife, retired, and apparently premorbidly was fairly independent.  Current

function as above.  

 

Other blood work, CBC was normal.  Chemistry was normal except for a high serum

folate.  B12 was normal at 529.  Hemoglobin A1c was elevated at 8.0. 

 

REVIEW OF SYSTEMS:  He notes some weakness in the lower extremities, difficulty

walking, but no numbness, tingling, or weakness in the upper lobes.  

 

PHYSICAL EXAMINATION:

General:  Patient is a petite man seen by sitting as well as standing and taking a

few steps from a wheelchair to a mat.  

HEENT:  Normocephalic, atraumatic.  Extraocular muscles appear full.  He has no

obvious facial weakness.  

Neck:  Supple.  

Extremities:  Without any pitting edema or calf tenderness.  

Neuromuscular:  He is awake, alert, cooperative.  He has got good muscle strength and

power and normal range of motion throughout his upper limbs with normal sensation. 

He may have some slightly diminished sensation to pinprick distally, but normal cold

temperature.  Decreased vibratory sense distally.  He has poor control of his lower

extremities but normal muscle bulk.  Seems to have brisk reflexes throughout the

lower extremities.  

 

For results of EMG nerve conduction studies, please refer to report for details.  

 

OVERALL IMPRESSION: 

1.  No electrodiagnostic evidence of advanced large fiber polyneuropathy, lumbosacral

plexopathy, lumbosacral radiculopathy.  

2.  Patient does have borderline to low amplitude motor conduction studies in the

distal right lower extremity, with some mild denervation of uncertain etiology, but

cannot rule out history of trauma to the distal right lower extremity.  

3.  Spasticity and brisk reflexes, appear to support an upper motor neuron process,

and patient is pending further blood work as well as magnetic resonance imaging of

the thoracic and lumbar spine.  

4.  Gait disorder.

5.  History of diabetes.

6.  Other past medical history as above.

 

PLAN AND SUGGESTION:

1.  Neurologic followup.

2.  I agree with MRI of the thoracic and lumbar spine.

3.  Continue physical therapy.

4.  DVT prophylaxis.  Continue subcutaneous heparin.

5.  Skin precaution.

6.  Safety fall precaution.

7.  May require inpatient rehabilitation.  

 

Thank you for this referral.  

 

 

LC FELDMAN M.D.

 

[id number]

DD: 04/11/2018 18:08

DT: 04/11/2018 20:10

Job #:  [id number]

## 2018-04-12 RX ADMIN — HEPARIN SODIUM SCH UNIT: 5000 INJECTION, SOLUTION INTRAVENOUS; SUBCUTANEOUS at 21:27

## 2018-04-12 RX ADMIN — DOCUSATE SODIUM SCH MG: 100 CAPSULE, LIQUID FILLED ORAL at 21:26

## 2018-04-12 RX ADMIN — SENNOSIDES SCH TAB: 8.6 TABLET, FILM COATED ORAL at 21:26

## 2018-04-12 RX ADMIN — LISINOPRIL SCH MG: 5 TABLET ORAL at 09:23

## 2018-04-12 RX ADMIN — INSULIN ASPART SCH UNITS: 100 INJECTION, SOLUTION INTRAVENOUS; SUBCUTANEOUS at 16:05

## 2018-04-12 RX ADMIN — INSULIN ASPART SCH: 100 INJECTION, SOLUTION INTRAVENOUS; SUBCUTANEOUS at 12:03

## 2018-04-12 RX ADMIN — DOXAZOSIN MESYLATE SCH MG: 4 TABLET ORAL at 21:27

## 2018-04-12 RX ADMIN — INSULIN ASPART SCH UNITS: 100 INJECTION, SOLUTION INTRAVENOUS; SUBCUTANEOUS at 06:21

## 2018-04-12 RX ADMIN — HEPARIN SODIUM SCH UNIT: 5000 INJECTION, SOLUTION INTRAVENOUS; SUBCUTANEOUS at 09:22

## 2018-04-12 RX ADMIN — INSULIN ASPART SCH: 100 INJECTION, SOLUTION INTRAVENOUS; SUBCUTANEOUS at 21:31

## 2018-04-12 RX ADMIN — POLYETHYLENE GLYCOL 3350 SCH GM: 17 POWDER, FOR SOLUTION ORAL at 09:23

## 2018-04-12 NOTE — PN
Progress Note (short form)





- Note


Progress Note: 








77 year old male , History of DM, HTN BPH complaining of numbness in lower 

extremity for three days.  He denies any significant back pain. He denies any 

trauma, fever or cancer.He has been constipated but no incontinence.


Patient has ct of l spine done and it shwed DJD, ct head is normal. 


no new symptoms today, still continue to have difficulty walking. mri done 

showed suspected to have cervical cord lesion and emg was quite unremarkable.








Neurological Examination





Alert oriented x 3, speech is normal, 


eomi, pupils is reactive, and no face asymmetry


there is normal strength, 


Lower extremity, Bilateral hip flexor, extensor, knee flexor and extensor, 

ankle dorsiflexion and extension are normal





There is hyper reflexia in lower extremity upto grade 3


planter is withdrawal


sensation is diminished to fine touch and pinprick diminished upto t 10 level





exam remain unchanged 














Assessment- Difficulty walking and THere is hyper reflexia in lower extremity 

adn diminished sensation  sensation upto t 10-12,  mri of T/L spine showed 

there is suspected cord compression


EMG was unremarkable for significant neuropathy 





Plan- suggest to do MRI C spine and Neurosurgery


continue supportive treatment 


would follow up with primary





Thanking you so much


Yogesh Rosas MD

## 2018-04-13 LAB
ANA NUCLEOLAR TITR SER: (no result) {TITER}
ANA SPECKLED TITR SER: (no result) {TITER}

## 2018-04-13 RX ADMIN — LISINOPRIL SCH MG: 5 TABLET ORAL at 09:29

## 2018-04-13 RX ADMIN — DOCUSATE SODIUM SCH MG: 100 CAPSULE, LIQUID FILLED ORAL at 06:22

## 2018-04-13 RX ADMIN — INSULIN ASPART SCH UNITS: 100 INJECTION, SOLUTION INTRAVENOUS; SUBCUTANEOUS at 10:28

## 2018-04-13 RX ADMIN — INSULIN ASPART SCH UNITS: 100 INJECTION, SOLUTION INTRAVENOUS; SUBCUTANEOUS at 16:05

## 2018-04-13 RX ADMIN — INSULIN ASPART SCH: 100 INJECTION, SOLUTION INTRAVENOUS; SUBCUTANEOUS at 06:22

## 2018-04-13 RX ADMIN — POLYETHYLENE GLYCOL 3350 SCH GM: 17 POWDER, FOR SOLUTION ORAL at 09:29

## 2018-04-13 RX ADMIN — INSULIN ASPART SCH: 100 INJECTION, SOLUTION INTRAVENOUS; SUBCUTANEOUS at 21:15

## 2018-04-13 RX ADMIN — DOCUSATE SODIUM SCH MG: 100 CAPSULE, LIQUID FILLED ORAL at 15:19

## 2018-04-13 RX ADMIN — HEPARIN SODIUM SCH UNIT: 5000 INJECTION, SOLUTION INTRAVENOUS; SUBCUTANEOUS at 09:29

## 2018-04-13 RX ADMIN — DOCUSATE SODIUM SCH MG: 100 CAPSULE, LIQUID FILLED ORAL at 21:14

## 2018-04-13 RX ADMIN — SENNOSIDES SCH TAB: 8.6 TABLET, FILM COATED ORAL at 21:14

## 2018-04-13 RX ADMIN — HEPARIN SODIUM SCH UNIT: 5000 INJECTION, SOLUTION INTRAVENOUS; SUBCUTANEOUS at 21:14

## 2018-04-13 RX ADMIN — DOXAZOSIN MESYLATE SCH MG: 4 TABLET ORAL at 21:15

## 2018-04-13 NOTE — CONSULT
Consult - text type





- Consultation


Consultation Note: 





NEUROSURGERY CONSULTATION





Patient is a 77 year old male who has a recent history of gait difficulty. 

Patient has been having difficulty walking since Monday and this is associated 

with numbness and weakness in his legs. He denies upper extremity symptoms. 

Thoracic and lumbar MRI revealed mild degenerative changes, however no clear 

etiology for his gait difficulty.  of thoracic MRI suggests subaxial 

cervical spine spondylosis with possible cord compression. Upon exam today, the 

patient has reasonably good strength in his lower extremities and improved 

sensation, however, the sensation is not yet normal. I agree with Dr. Rosas 

that cervical spine MRI will be important in his management and brain MRI is 

also reasonable given his significant ataxia. Will review these studies and 

report back with surgical evaluation.

## 2018-04-13 NOTE — PN
Progress Note (short form)





- Note


Progress Note: 








77 year old male , History of DM, HTN BPH complaining of numbness in lower 

extremity for one week.  He denies any significant back pain. He denies any 

trauma, fever or cancer.He has been constipated but no incontinence.


Patient has ct of l spine done and it shwed DJD, ct head is normal. He continue 

to be very ataxic, his strength seems to be good.


Patient had mri of  l and t spine and it showed there is cord compression at c5-

6 level, he has hyper reflexia in lower extremity.





Neurological Examination





Alert oriented x 3, speech is normal, 


eomi, pupils is reactive, and no face asymmetry


there is normal strength, 


Lower extremity, Bilateral hip flexor, extensor, knee flexor and extensor, 

ankle dorsiflexion and extension are normal








There is hyper reflexia in lower extremity upto grade 3


planter is withdrawal


Today his sensation was almost normal, he indicate that there is very mild 

sensation difference below t10 and his sensation is normal for pinprick


his gait is very ataxic, he has difficulty standing and walkign .

















Assessment- Difficulty walking  and severe ataxic gait with hyper reflexia 

diminished sensation below t 10 . Emg was quite unremarkable.


Suspician for cord compression at c5-6.





Plan- waiting for mri of c spine, given he has severe ataxic gait, I would add 

mri of brain for cerebellar stroke.


still waiting for Neurosurgery input





continue supportive treatment 


would follow up with primary





Thanking you so much


Yogesh Rosas MD

## 2018-04-13 NOTE — PN
Progress Note, Physician


History of Present Illness: 





c/o weakness and numbness of legs





- Current Medication List


Current Medications: 


Active Medications





Docusate Sodium (Colace -)  100 mg PO TID [facility]


   Last Admin: 04/13/18 06:22 Dose:  100 mg


Doxazosin Mesylate (Cardura -)  8 mg PO HS [facility]


   Last Admin: 04/12/18 21:27 Dose:  8 mg


Heparin Sodium (Porcine) (Heparin -)  5,000 unit SQ BID [facility]


   Last Admin: 04/12/18 21:27 Dose:  5,000 unit


Insulin Aspart (Novolog Vial Sliding Scale -)  0 vial SQ [facility]


   PRN Reason: Protocol


   Last Admin: 04/12/18 21:31 Dose:  Not Given


Insulin Aspart (Novolog Vial Sliding Scale -)  0 vial SQ TIDAC [facility]


   PRN Reason: Protocol


   Last Admin: 04/13/18 06:22 Dose:  Not Given


Lisinopril (Prinivil)  5 mg PO DAILY [facility]


   Last Admin: 04/12/18 09:23 Dose:  5 mg


Polyethylene Glycol (Miralax (For Daily Use) -)  17 gm PO DAILY [facility]


   Last Admin: 04/12/18 09:23 Dose:  17 gm


Senna (Senna -)  2 tab PO [facility]


   Last Admin: 04/12/18 21:26 Dose:  2 tab


Sitagliptin Phosphate (Januvia -)  50 mg PO DAILY@0700 [facility]


   Last Admin: 04/13/18 06:22 Dose:  50 mg











- Objective


Vital Signs: 


 Vital Signs











Temperature  98 F   04/13/18 05:35


 


Pulse Rate  70   04/13/18 05:35


 


Respiratory Rate  18   04/13/18 05:35


 


Blood Pressure  140/78   04/13/18 05:35


 


O2 Sat by Pulse Oximetry (%)  97   04/13/18 04:00











Cardiovascular: Yes: Regular Rate and Rhythm


Respiratory: Yes: Regular, CTA Bilaterally


Gastrointestinal: Yes: Normal Bowel Sounds, Soft


Labs: 


 CBC, BMP





 04/11/18 08:00 





 04/11/18 08:00 





 INR, PTT











INR  0.92  (0.82-1.09)   04/09/18  19:15    














Problem List





- Problems


(1) Bilateral leg paresthesia


Assessment/Plan: 


-Neuro consult noted


-r/o spinal lesion


-MRI-noted--disc disease with stenosis--ns consult


_emg noted


-pt eval


-dvt prophylaxis


-MRI of C-Spine


Code(s): R20.2 - PARESTHESIA OF SKIN   





(2) Diabetes


Assessment/Plan: 


-dems


-bgm


Code(s): E11.9 - TYPE 2 DIABETES MELLITUS WITHOUT COMPLICATIONS   





(3) Spinal stenosis


Assessment/Plan: 


NS Consult


PT


Code(s): M48.00 - SPINAL STENOSIS, SITE UNSPECIFIED

## 2018-04-14 RX ADMIN — INSULIN ASPART SCH UNITS: 100 INJECTION, SOLUTION INTRAVENOUS; SUBCUTANEOUS at 06:29

## 2018-04-14 RX ADMIN — INSULIN ASPART SCH UNITS: 100 INJECTION, SOLUTION INTRAVENOUS; SUBCUTANEOUS at 16:13

## 2018-04-14 RX ADMIN — HEPARIN SODIUM SCH UNIT: 5000 INJECTION, SOLUTION INTRAVENOUS; SUBCUTANEOUS at 09:50

## 2018-04-14 RX ADMIN — HEPARIN SODIUM SCH UNIT: 5000 INJECTION, SOLUTION INTRAVENOUS; SUBCUTANEOUS at 21:27

## 2018-04-14 RX ADMIN — INSULIN ASPART SCH UNITS: 100 INJECTION, SOLUTION INTRAVENOUS; SUBCUTANEOUS at 11:20

## 2018-04-14 RX ADMIN — LISINOPRIL SCH MG: 5 TABLET ORAL at 09:50

## 2018-04-14 RX ADMIN — INSULIN ASPART SCH UNITS: 100 INJECTION, SOLUTION INTRAVENOUS; SUBCUTANEOUS at 21:32

## 2018-04-14 RX ADMIN — DOCUSATE SODIUM SCH MG: 100 CAPSULE, LIQUID FILLED ORAL at 13:57

## 2018-04-14 RX ADMIN — POLYETHYLENE GLYCOL 3350 SCH GM: 17 POWDER, FOR SOLUTION ORAL at 09:50

## 2018-04-14 RX ADMIN — DOCUSATE SODIUM SCH MG: 100 CAPSULE, LIQUID FILLED ORAL at 06:30

## 2018-04-14 RX ADMIN — SENNOSIDES SCH TAB: 8.6 TABLET, FILM COATED ORAL at 21:26

## 2018-04-14 RX ADMIN — DOXAZOSIN MESYLATE SCH MG: 4 TABLET ORAL at 21:25

## 2018-04-14 RX ADMIN — DOCUSATE SODIUM SCH MG: 100 CAPSULE, LIQUID FILLED ORAL at 21:26

## 2018-04-14 NOTE — PN
Progress Note (short form)





- Note


Progress Note: 





7 year old male , History of DM, HTN BPH complaining of numbness in lower 

extremity for one week.  He denies any significant back pain. He denies any 

trauma, fever or cancer.He has been constipated but no incontinence.





he contine to be severely ataxic, and today he is complaining when he was 

sitting on toilet area, he could not feel perianal area.





Neurological Examination





Alert oriented x 3, speech is normal, 


eomi, pupils is reactive, and no face asymmetry


there is normal strength, 


Lower extremity, Bilateral hip flexor, extensor, knee flexor and extensor, 

ankle dorsiflexion and extension are normal











There is hyper reflexia in lower extremity upto grade 3


planter is withdrawal





There is mild diminisehd sensation below t12 and there is diminished sensation 

in perianal area and rectal tone is normal














Assessment- Difficulty walking  and severe ataxic gait with hyper reflexia 

diminished sensation below t 10 .  There is cord signal changes at c5-6 and 

cord compression.





Plan- mri of c spine reviewed , and Suggest to reconsult neurosurgery regarding 

further management


One dose of Dexamethasone 10 iv once





Thanking you so much


Yogesh Rosas MD

## 2018-04-14 NOTE — PN
Progress Note, Physician





- Current Medication List


Current Medications: 


Active Medications





Docusate Sodium (Colace -)  100 mg PO TID [facility]


   Last Admin: 04/14/18 06:30 Dose:  100 mg


Doxazosin Mesylate (Cardura -)  8 mg PO HS [facility]


   Last Admin: 04/13/18 21:15 Dose:  8 mg


Heparin Sodium (Porcine) (Heparin -)  5,000 unit SQ BID [facility]


   Last Admin: 04/14/18 09:50 Dose:  5,000 unit


Insulin Aspart (Novolog Vial Sliding Scale -)  0 vial SQ HS [facility]


   PRN Reason: Protocol


   Last Admin: 04/13/18 21:15 Dose:  Not Given


Insulin Aspart (Novolog Vial Sliding Scale -)  0 vial SQ TIDAC [facility]


   PRN Reason: Protocol


   Last Admin: 04/14/18 11:20 Dose:  2 units


Lisinopril (Prinivil)  5 mg PO DAILY [facility]


   Last Admin: 04/14/18 09:50 Dose:  5 mg


Polyethylene Glycol (Miralax (For Daily Use) -)  17 gm PO DAILY [facility]


   Last Admin: 04/14/18 09:50 Dose:  17 gm


Senna (Senna -)  2 tab PO [facility]


   Last Admin: 04/13/18 21:14 Dose:  2 tab


Sitagliptin Phosphate (Januvia -)  50 mg PO DAILY@0700 [facility]


   Last Admin: 04/14/18 06:30 Dose:  50 mg











- Objective


Vital Signs: 


 Vital Signs











Temperature  97.7 F   04/14/18 09:00


 


Pulse Rate  65   04/14/18 09:00


 


Respiratory Rate  20   04/14/18 09:00


 


Blood Pressure  112/59   04/14/18 09:00


 


O2 Sat by Pulse Oximetry (%)  97   04/14/18 01:00











Cardiovascular: Yes: Regular Rate and Rhythm


Respiratory: Yes: Regular, CTA Bilaterally


Gastrointestinal: Yes: Normal Bowel Sounds, Soft


Labs: 


 CBC, BMP





 04/11/18 08:00 





 04/11/18 08:00 





 INR, PTT











INR  0.92  (0.82-1.09)   04/09/18  19:15    














Problem List





- Problems


(1) Bilateral leg paresthesia


Assessment/Plan: 


-Neuro consult noted


-r/o spinal lesion


-MRI-noted--disc disease with stenosis--ns consult


_emg noted


-pt eval


-dvt prophylaxis


-MRI of C-Spine NOTED--NS FOLLOW UP


-D/W PT AND DAUGHTER


Code(s): R20.2 - PARESTHESIA OF SKIN   





(2) Diabetes


Assessment/Plan: 


-dems


-bgm


Code(s): E11.9 - TYPE 2 DIABETES MELLITUS WITHOUT COMPLICATIONS   





(3) Spinal stenosis


Assessment/Plan: 


NS Consult


PT


Code(s): M48.00 - SPINAL STENOSIS, SITE UNSPECIFIED

## 2018-04-15 LAB
APPEARANCE UR: CLEAR
BILIRUB UR STRIP.AUTO-MCNC: NEGATIVE MG/DL
COLOR UR: (no result)
KETONES UR QL STRIP: (no result)
LEUKOCYTE ESTERASE UR QL STRIP.AUTO: NEGATIVE
NITRITE UR QL STRIP: NEGATIVE
PH UR: 5 [PH] (ref 5–8)
PROT UR QL STRIP: (no result)
PROT UR QL STRIP: NEGATIVE
RBC # UR STRIP: NEGATIVE /UL
SP GR UR: 1.02 (ref 1–1.03)
UROBILINOGEN UR STRIP-MCNC: NEGATIVE MG/DL (ref 0.2–1)

## 2018-04-15 RX ADMIN — INSULIN ASPART SCH: 100 INJECTION, SOLUTION INTRAVENOUS; SUBCUTANEOUS at 16:20

## 2018-04-15 RX ADMIN — INSULIN ASPART SCH UNITS: 100 INJECTION, SOLUTION INTRAVENOUS; SUBCUTANEOUS at 11:11

## 2018-04-15 RX ADMIN — INSULIN ASPART SCH UNITS: 100 INJECTION, SOLUTION INTRAVENOUS; SUBCUTANEOUS at 21:02

## 2018-04-15 RX ADMIN — HEPARIN SODIUM SCH UNIT: 5000 INJECTION, SOLUTION INTRAVENOUS; SUBCUTANEOUS at 21:03

## 2018-04-15 RX ADMIN — HEPARIN SODIUM SCH UNIT: 5000 INJECTION, SOLUTION INTRAVENOUS; SUBCUTANEOUS at 10:42

## 2018-04-15 RX ADMIN — DOXAZOSIN MESYLATE SCH MG: 4 TABLET ORAL at 21:03

## 2018-04-15 RX ADMIN — LISINOPRIL SCH MG: 5 TABLET ORAL at 10:42

## 2018-04-15 RX ADMIN — DOCUSATE SODIUM SCH MG: 100 CAPSULE, LIQUID FILLED ORAL at 21:03

## 2018-04-15 RX ADMIN — DOCUSATE SODIUM SCH MG: 100 CAPSULE, LIQUID FILLED ORAL at 14:07

## 2018-04-15 RX ADMIN — SENNOSIDES SCH TAB: 8.6 TABLET, FILM COATED ORAL at 21:03

## 2018-04-15 RX ADMIN — DOCUSATE SODIUM SCH MG: 100 CAPSULE, LIQUID FILLED ORAL at 06:24

## 2018-04-15 RX ADMIN — POLYETHYLENE GLYCOL 3350 SCH GM: 17 POWDER, FOR SOLUTION ORAL at 10:42

## 2018-04-15 RX ADMIN — INSULIN ASPART SCH UNITS: 100 INJECTION, SOLUTION INTRAVENOUS; SUBCUTANEOUS at 06:24

## 2018-04-15 NOTE — PN
Progress Note (short form)





- Note


Progress Note: 








77 year old male , History of DM, HTN BPH complaining of numbness in lower 

extremity for one week.  He denies any significant back pain. He denies any 

trauma, fever or cancer.He has been constipated but no incontinence.





he contine to be severely ataxic, and today he is complaining when he was 

sitting on toilet area, he could not feel perianal area. mri of brain 

unremarkable and c spine showed there is cord compression





Neurological Examination





Alert oriented x 3, speech is normal, 


eomi, pupils is reactive, and no face asymmetry


there is normal strength, 


Lower extremity, Bilateral hip flexor, extensor, knee flexor and extensor, 

ankle dorsiflexion and extension are normal











There is hyper reflexia in lower extremity upto grade 3


planter is withdrawal





There is mild diminisehd sensation below t12 and there is diminished sensation 

in perianal area and rectal tone is normal














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 

input.


Plan- he was given dexamethasone iv once, I advice to wait for definite 

treatment as per Neurosurgery .





Thanking you so much


Yogesh Rosas MD

## 2018-04-15 NOTE — PN
Progress Note, Physician


History of Present Illness: 





c/o weakness and numbness of legs





- Current Medication List


Current Medications: 


Active Medications





Docusate Sodium (Colace -)  100 mg PO TID [facility]


   Last Admin: 04/15/18 06:24 Dose:  100 mg


Doxazosin Mesylate (Cardura -)  8 mg PO HS [facility]


   Last Admin: 04/14/18 21:25 Dose:  8 mg


Heparin Sodium (Porcine) (Heparin -)  5,000 unit SQ BID [facility]


   Last Admin: 04/15/18 10:42 Dose:  5,000 unit


Insulin Aspart (Novolog Vial Sliding Scale -)  0 vial SQ [facility]


   PRN Reason: Protocol


   Last Admin: 04/14/18 21:32 Dose:  2 units


Insulin Aspart (Novolog Vial Sliding Scale -)  0 vial SQ TIDAC [facility]


   PRN Reason: Protocol


   Last Admin: 04/15/18 11:11 Dose:  3 units


Lisinopril (Prinivil)  5 mg PO DAILY [facility]


   Last Admin: 04/15/18 10:42 Dose:  5 mg


Polyethylene Glycol (Miralax (For Daily Use) -)  17 gm PO DAILY [facility]


   Last Admin: 04/15/18 10:42 Dose:  17 gm


Senna (Senna -)  2 tab PO [facility]


   Last Admin: 04/14/18 21:26 Dose:  2 tab


Sitagliptin Phosphate (Januvia -)  50 mg PO DAILY@0700 [facility]


   Last Admin: 04/15/18 06:24 Dose:  50 mg











- Objective


Vital Signs: 


 Vital Signs











Temperature  97.8 F   04/15/18 08:58


 


Pulse Rate  71   04/15/18 08:58


 


Respiratory Rate  20   04/15/18 08:58


 


Blood Pressure  125/67   04/15/18 08:58


 


O2 Sat by Pulse Oximetry (%)  97   04/14/18 01:00











Cardiovascular: Yes: S1, S2


Respiratory: Yes: Regular, CTA Bilaterally


Gastrointestinal: Yes: Normal Bowel Sounds, Soft


Labs: 


 CBC, BMP





 04/11/18 08:00 





 04/11/18 08:00 





 INR, PTT











INR  0.92  (0.82-1.09)   04/09/18  19:15    














Problem List





- Problems


(1) Bilateral leg paresthesia


Assessment/Plan: 


-Neuro consult noted


-spinal stenosis--NS follow up


-MRI-noted--disc disease with stenosis--ns consult


_emg noted


-pt eval


-dvt prophylaxis


-MRI of C-Spine NOTED--NS FOLLOW UP


-D/W PT AND DAUGHTER


-some response to steroids


Code(s): R20.2 - PARESTHESIA OF SKIN   





(2) Diabetes


Assessment/Plan: 


-dems


-bgm


Code(s): E11.9 - TYPE 2 DIABETES MELLITUS WITHOUT COMPLICATIONS   





(3) Spinal stenosis


Assessment/Plan: 


NS Consult


PT


Code(s): M48.00 - SPINAL STENOSIS, SITE UNSPECIFIED

## 2018-04-16 RX ADMIN — HEPARIN SODIUM SCH UNIT: 5000 INJECTION, SOLUTION INTRAVENOUS; SUBCUTANEOUS at 21:28

## 2018-04-16 RX ADMIN — INSULIN ASPART SCH UNITS: 100 INJECTION, SOLUTION INTRAVENOUS; SUBCUTANEOUS at 12:12

## 2018-04-16 RX ADMIN — POLYETHYLENE GLYCOL 3350 SCH GM: 17 POWDER, FOR SOLUTION ORAL at 10:18

## 2018-04-16 RX ADMIN — DOCUSATE SODIUM SCH MG: 100 CAPSULE, LIQUID FILLED ORAL at 14:46

## 2018-04-16 RX ADMIN — LISINOPRIL SCH MG: 5 TABLET ORAL at 10:19

## 2018-04-16 RX ADMIN — INSULIN ASPART SCH UNITS: 100 INJECTION, SOLUTION INTRAVENOUS; SUBCUTANEOUS at 21:29

## 2018-04-16 RX ADMIN — SENNOSIDES SCH TAB: 8.6 TABLET, FILM COATED ORAL at 21:29

## 2018-04-16 RX ADMIN — DOCUSATE SODIUM SCH MG: 100 CAPSULE, LIQUID FILLED ORAL at 06:24

## 2018-04-16 RX ADMIN — HEPARIN SODIUM SCH UNIT: 5000 INJECTION, SOLUTION INTRAVENOUS; SUBCUTANEOUS at 10:14

## 2018-04-16 RX ADMIN — DOXAZOSIN MESYLATE SCH MG: 4 TABLET ORAL at 22:00

## 2018-04-16 RX ADMIN — INSULIN ASPART SCH UNITS: 100 INJECTION, SOLUTION INTRAVENOUS; SUBCUTANEOUS at 06:25

## 2018-04-16 RX ADMIN — DOCUSATE SODIUM SCH MG: 100 CAPSULE, LIQUID FILLED ORAL at 21:26

## 2018-04-16 RX ADMIN — INSULIN ASPART SCH UNITS: 100 INJECTION, SOLUTION INTRAVENOUS; SUBCUTANEOUS at 16:16

## 2018-04-16 NOTE — SPA.PREOP
- PRE-OP NOTE





Dx: gait instability, lumbar radiculopathy, myelopathic





Planned Procedure: Cervical 3-7 laminectomies, restoration of lordosis and 

posterior fusion with Lateral mass instrumentation





Surgeon: Aleshia





Consent: To be obtained after surgeon explains all risks, benefits and 

alternatives. 





 Last Vital Signs











Temp Pulse Resp BP Pulse Ox


 


 97.8 F   70   20   124/67   97 


 


 04/16/18 08:25  04/16/18 08:25  04/16/18 08:25  04/16/18 08:25  04/14/18 01:00








 Lab Results











WBC  5.2 K/mm3 (4.0-10.0)  D 04/11/18  08:00    


 


RBC  4.77 M/mm3 (4.00-5.60)   04/11/18  08:00    


 


Hgb  14.4 GM/dL (11.7-16.9)   04/11/18  08:00    


 


Hct  42.1 % (35.4-49)   04/11/18  08:00    


 


MCV  88.4 fl (80-96)   04/11/18  08:00    


 


MCHC  34.1 g/dl (32.0-35.9)   04/11/18  08:00    


 


RDW  14.9 % (11.9-15.9)   04/11/18  08:00    


 


Plt Count  223 K/MM3 (134-434)   04/11/18  08:00    


 


Sodium  138 mmol/L (136-145)   04/11/18  08:00    


 


Potassium  4.2 mmol/L (3.5-5.1)   04/11/18  08:00    


 


Chloride  103 mmol/L ()   04/11/18  08:00    


 


Carbon Dioxide  25 mmol/L (21-32)   04/11/18  08:00    


 


Anion Gap  10  (8-16)   04/11/18  08:00    


 


BUN  15 mg/dL (7-18)   04/11/18  08:00    


 


Creatinine  0.8 mg/dL (0.7-1.3)   04/11/18  08:00    


 


Random Glucose  136 mg/dL ()  H  04/11/18  08:00    


 


Calcium  9.1 mg/dL (8.5-10.1)   04/11/18  08:00    


 


INR  0.92  (0.82-1.09)   04/09/18  19:15    














- ASSESSMENT/PLAN





1. Make NPO after midnight except po meds


2. GI/DVT PPX


3. Medical optimization / clearance


4. Hibiclens shower tonight








Visit type





- Case Type


Case Type: ED Admission





- New patient


This patient is new to me today: Yes


Date on this admission: 04/16/18

## 2018-04-16 NOTE — PN
Progress Note (short form)





- Note


Progress Note: 








77 year old male , History of DM, HTN BPH complaining of numbness in lower 

extremity for one week.  He denies any significant back pain. He denies any 

trauma, fever or cancer.He has been constipated but no incontinence.





he contine to be severely ataxic, and today he is complaining when he was 

sitting on toilet area, he could not feel perianal area. mri of brain 

unremarkable and c spine showed there is cord compression


no new symptoms





Neurological Examination





Alert oriented x 3, speech is normal, 


eomi, pupils is reactive, and no face asymmetry


there is normal strength, 


Lower extremity, Bilateral hip flexor, extensor, knee flexor and extensor, 

ankle dorsiflexion and extension are normal











There is hyper reflexia in lower extremity upto grade 3


planter is withdrawal





There is mild diminisehd sensation below t12 and there is diminished sensation 

in perianal area and rectal tone is normal














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 

Rosario), Dr Morales came to see patient and Patient does not seem to be 

interested in surgery and would discuss with family again today.





Plan NS to discuss further care with family 





Thanking you so much


Yogesh Rosas MD

## 2018-04-16 NOTE — PN
Progress Note (short form)





- Note


Progress Note: 





Patient is a 77 year old male who has a recent history of gait difficulty. 

Patient has been having difficulty walking since Monday and this is associated 

with numbness and weakness in his legs. He denies upper extremity symptoms. 

Thoracic and lumbar MRI revealed mild degenerative changes, however no clear 

etiology for his gait difficulty.  of thoracic MRI suggests subaxial 

cervical spine spondylosis with possible cord compression. Upon exam today, the 

patient has reasonably good strength in his lower extremities and improved 

sensation, however, the sensation is not yet normal. 





MRI Cervical demonstrates significant hypertrophy of the Posterior Longitudinal 

Ligament and osteophytes as well as disc bulges which encroach upon a 

congenitally narrow spinal canal and efface the ventral CSF space. The dorsal 

CSF space is effaced by hypertrophic facets and ligamentum flavum. The patient 

has a congenitally narrow spinal canal which contributes to his compression. 

There is a loss of lordosis in the subaxial Cervical spine as well.





I had a series of discussions with the patient and his family using an 

 to discuss his clinical course, current condition and various 

treatment options. I described the risks, benefits and alternatives to Cervical 

3-7 laminectomies, restoration of lordosis and posterior fusion with Lateral 

mass instrumentation. I explained that the risks included, but were not limited 

to: death, coma, paralysis, bleeding, infection, CSF leakage possibly requiring 

spinal drainage or additional surgery, failure to fuse, instrumentation 

migration/malposition/malfunction and the need for additional surgery. All 

questions were answered. Informed consent was obtained. The patient and family 

were able to ask good questions and demonstrated a sound knowledge of the 

information presented including the likely need for postoperative 

rehabilitation. I offered them the option of seeking another opinion or another 

surgeon.





They ask that we proceed with surgery as described.

## 2018-04-16 NOTE — PN
Progress Note, Physician


Chief Complaint: 





patient seen and examined awaiting ARSLAN follow up








- Current Medication List


Current Medications: 


Active Medications





Docusate Sodium (Colace -)  100 mg PO TID [facility]


   Last Admin: 04/16/18 06:24 Dose:  100 mg


Doxazosin Mesylate (Cardura -)  8 mg PO HS [facility]


   Last Admin: 04/15/18 21:03 Dose:  8 mg


Heparin Sodium (Porcine) (Heparin -)  5,000 unit SQ BID [facility]


   Last Admin: 04/16/18 10:14 Dose:  5,000 unit


Insulin Aspart (Novolog Vial Sliding Scale -)  0 vial SQ HS [facility]


   PRN Reason: Protocol


   Last Admin: 04/15/18 21:02 Dose:  3 units


Insulin Aspart (Novolog Vial Sliding Scale -)  0 vial SQ TIDAC [facility]


   PRN Reason: Protocol


   Last Admin: 04/16/18 12:12 Dose:  3 units


Lisinopril (Prinivil)  5 mg PO DAILY [facility]


   Last Admin: 04/16/18 10:19 Dose:  5 mg


Polyethylene Glycol (Miralax (For Daily Use) -)  17 gm PO DAILY [facility]


   Last Admin: 04/16/18 10:18 Dose:  17 gm


Senna (Senna -)  2 tab PO [facility]


   Last Admin: 04/15/18 21:03 Dose:  2 tab


Sitagliptin Phosphate (Januvia -)  50 mg PO DAILY@0700 [facility]


   Last Admin: 04/16/18 06:25 Dose:  50 mg











- Objective


Vital Signs: 


 Vital Signs











Temperature  97.8 F   04/16/18 08:25


 


Pulse Rate  70   04/16/18 08:25


 


Respiratory Rate  20   04/16/18 08:25


 


Blood Pressure  124/67   04/16/18 08:25


 


O2 Sat by Pulse Oximetry (%)  97   04/14/18 01:00











Constitutional: Yes: Calm


Neck: Yes: Trachea Midline


Cardiovascular: Yes: Regular Rate and Rhythm, S1, S2


Respiratory: Yes: CTA Bilaterally


Gastrointestinal: Yes: Normal Bowel Sounds, Soft


Edema: No


Neurological: Yes: Alert, Oriented


Labs: 


 CBC, BMP





 04/11/18 08:00 





 04/11/18 08:00 





 INR, PTT











INR  0.92  (0.82-1.09)   04/09/18  19:15    














Problem List





- Problems


(1) Bilateral leg paresthesia


Assessment/Plan: 


cord compression noted 


got iv steroid


chart reviewed


possible surgery


neurology, ARSLAN on board


Code(s): R20.2 - PARESTHESIA OF SKIN   





(2) Diabetes


Assessment/Plan: 


hgba1c 8.0


januvia 100mg po daily increased januvia 50 mg


Code(s): E11.9 - TYPE 2 DIABETES MELLITUS WITHOUT COMPLICATIONS   


Qualifiers: 


   Diabetes mellitus type: type 2 





(3) Spinal stenosis


Assessment/Plan: 


ARSLAN on board


    


Code(s): M48.00 - SPINAL STENOSIS, SITE UNSPECIFIED

## 2018-04-17 LAB
ALBUMIN SERPL-MCNC: 3.8 G/DL (ref 3.4–5)
ALP SERPL-CCNC: 63 U/L (ref 45–117)
ALT SERPL-CCNC: 30 U/L (ref 12–78)
ANION GAP SERPL CALC-SCNC: 9 MMOL/L (ref 8–16)
AST SERPL-CCNC: 15 U/L (ref 15–37)
BASOPHILS # BLD: 0.8 % (ref 0–2)
BILIRUB SERPL-MCNC: 0.7 MG/DL (ref 0.2–1)
BUN SERPL-MCNC: 14 MG/DL (ref 7–18)
CALCIUM SERPL-MCNC: 9 MG/DL (ref 8.5–10.1)
CHLORIDE SERPL-SCNC: 104 MMOL/L (ref 98–107)
CO2 SERPL-SCNC: 23 MMOL/L (ref 21–32)
COMPLEMENT TOTAL(CH50): 51 U/ML (ref 42–60)
CREAT SERPL-MCNC: 0.7 MG/DL (ref 0.7–1.3)
DEPRECATED RDW RBC AUTO: 14 % (ref 11.9–15.9)
EOSINOPHIL # BLD: 1.1 % (ref 0–4.5)
GLUCOSE SERPL-MCNC: 160 MG/DL (ref 74–106)
HCT VFR BLD CALC: 41.8 % (ref 35.4–49)
HGB BLD-MCNC: 14.2 GM/DL (ref 11.7–16.9)
LYMPHOCYTES # BLD: 24 % (ref 8–40)
MCH RBC QN AUTO: 30 PG (ref 25.7–33.7)
MCHC RBC AUTO-ENTMCNC: 33.9 G/DL (ref 32–35.9)
MCV RBC: 88.5 FL (ref 80–96)
MONOCYTES # BLD AUTO: 9 % (ref 3.8–10.2)
NEUTROPHILS # BLD: 65.1 % (ref 42.8–82.8)
PLATELET # BLD AUTO: 235 K/MM3 (ref 134–434)
PMV BLD: 8.5 FL (ref 7.5–11.1)
POTASSIUM SERPLBLD-SCNC: 4.2 MMOL/L (ref 3.5–5.1)
PROT SERPL-MCNC: 6.9 G/DL (ref 6.4–8.2)
RBC # BLD AUTO: 4.72 M/MM3 (ref 4–5.6)
SODIUM SERPL-SCNC: 136 MMOL/L (ref 136–145)
WBC # BLD AUTO: 5.8 K/MM3 (ref 4–10)

## 2018-04-17 PROCEDURE — 0HX4XZZ TRANSFER NECK SKIN, EXTERNAL APPROACH: ICD-10-PCS | Performed by: NEUROLOGICAL SURGERY

## 2018-04-17 PROCEDURE — 0RG207J FUSION OF 2 OR MORE CERVICAL VERTEBRAL JOINTS WITH AUTOLOGOUS TISSUE SUBSTITUTE, POSTERIOR APPROACH, ANTERIOR COLUMN, OPEN APPROACH: ICD-10-PCS | Performed by: NEUROLOGICAL SURGERY

## 2018-04-17 PROCEDURE — 00NW0ZZ RELEASE CERVICAL SPINAL CORD, OPEN APPROACH: ICD-10-PCS | Performed by: NEUROLOGICAL SURGERY

## 2018-04-17 RX ADMIN — DOCUSATE SODIUM SCH MG: 100 CAPSULE, LIQUID FILLED ORAL at 21:12

## 2018-04-17 RX ADMIN — SODIUM CHLORIDE SCH MLS/HR: 9 INJECTION, SOLUTION INTRAVENOUS at 18:28

## 2018-04-17 RX ADMIN — HEPARIN SODIUM SCH UNIT: 5000 INJECTION, SOLUTION INTRAVENOUS; SUBCUTANEOUS at 21:12

## 2018-04-17 RX ADMIN — CEFAZOLIN SCH MLS/HR: 1 INJECTION, POWDER, FOR SOLUTION INTRAVENOUS at 19:40

## 2018-04-17 RX ADMIN — INSULIN ASPART SCH UNITS: 100 INJECTION, SOLUTION INTRAVENOUS; SUBCUTANEOUS at 21:12

## 2018-04-17 RX ADMIN — INSULIN ASPART SCH: 100 INJECTION, SOLUTION INTRAVENOUS; SUBCUTANEOUS at 06:07

## 2018-04-17 RX ADMIN — INSULIN ASPART SCH UNIT: 100 INJECTION, SOLUTION INTRAVENOUS; SUBCUTANEOUS at 18:34

## 2018-04-17 RX ADMIN — SENNOSIDES SCH TAB: 8.6 TABLET, FILM COATED ORAL at 21:11

## 2018-04-17 RX ADMIN — DOCUSATE SODIUM SCH: 100 CAPSULE, LIQUID FILLED ORAL at 13:01

## 2018-04-17 RX ADMIN — DOXAZOSIN MESYLATE SCH MG: 4 TABLET ORAL at 21:11

## 2018-04-17 RX ADMIN — INSULIN ASPART SCH: 100 INJECTION, SOLUTION INTRAVENOUS; SUBCUTANEOUS at 10:59

## 2018-04-17 RX ADMIN — POLYETHYLENE GLYCOL 3350 SCH: 17 POWDER, FOR SOLUTION ORAL at 09:56

## 2018-04-17 RX ADMIN — DOCUSATE SODIUM SCH: 100 CAPSULE, LIQUID FILLED ORAL at 05:39

## 2018-04-17 RX ADMIN — HEPARIN SODIUM SCH: 5000 INJECTION, SOLUTION INTRAVENOUS; SUBCUTANEOUS at 09:55

## 2018-04-17 RX ADMIN — LISINOPRIL SCH: 5 TABLET ORAL at 09:56

## 2018-04-17 NOTE — PN
Progress Note (short form)





- Note


Progress Note: 








77 year old male , History of DM, HTN BPH complaining of numbness in lower 

extremity for one week.  He denies any significant back pain. He denies any 

trauma, fever or cancer.He has been constipated but no incontinence.





He was found to have cord compression on mri of c spine. He is undergoing for 

surgery .


no new symptoms


Neurological Examination





Alert oriented x 3, speech is normal, 


eomi, pupils is reactive, and no face asymmetry


there is normal strength, 


Lower extremity, Bilateral hip flexor, extensor, knee flexor and extensor, 

ankle dorsiflexion and extension are normal











There is hyper reflexia in lower extremity upto grade 3


planter is withdrawal




















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 


Plan- undergoing for surgery, 





Thanking you so much


Yogesh Rosas MD

## 2018-04-17 NOTE — SURG
Surgery First Assist Note


First Assist: Teodora Fournier PA-C


Date of Service: 04/17/18


Diagnosis: 





cervical radiculopathy


Procedure: 





C3-C7 cervical laminectomies with fusion


I was present for the entirety of the operative procedure. For further detail, 

please refer to operative report.








Visit type





- Case Type


Case Type: ED Admission





- Emergency


Emergency Visit: Yes


ED Registration Date: 04/12/18


Care time: The patient presented to the Emergency Department on the above date 

and was hospitalized for further evaluation of their emergent condition.





- New patient


This patient is new to me today: Yes


Date on this admission: 04/17/18

## 2018-04-17 NOTE — OP
Operative Note





- Note:


Operative Date: 04/17/18


Pre-Operative Diagnosis: cervical radiculopathy


Operation: C3-C7 cervical laminectomies, with fusion


Post-Operative Diagnosis: Same as Pre-op


Surgeon: Gucci Monk


Assistant: Teodora Fournier


Anesthesiologist/CRNA: Valentin Lang


Anesthesia: General


Estimated Blood Loss (mls): 350


Drains & Tubes with Location: j/p drain at right cervical paravertebral area


Fluid Volume Replaced (mls): 1,100


Operative Report Dictated: Yes

## 2018-04-18 LAB
ALBUMIN SERPL-MCNC: 3.2 G/DL (ref 3.4–5)
ALP SERPL-CCNC: 58 U/L (ref 45–117)
ALT SERPL-CCNC: 24 U/L (ref 12–78)
ANION GAP SERPL CALC-SCNC: 8 MMOL/L (ref 8–16)
APPEARANCE UR: (no result)
AST SERPL-CCNC: 19 U/L (ref 15–37)
BILIRUB SERPL-MCNC: 0.4 MG/DL (ref 0.2–1)
BILIRUB UR STRIP.AUTO-MCNC: NEGATIVE MG/DL
BUN SERPL-MCNC: 14 MG/DL (ref 7–18)
CALCIUM SERPL-MCNC: 8 MG/DL (ref 8.5–10.1)
CHLORIDE SERPL-SCNC: 102 MMOL/L (ref 98–107)
CO2 SERPL-SCNC: 26 MMOL/L (ref 21–32)
COLOR UR: YELLOW
CREAT SERPL-MCNC: 0.9 MG/DL (ref 0.7–1.3)
DEPRECATED RDW RBC AUTO: 14.2 % (ref 11.9–15.9)
EPITH CASTS URNS QL MICRO: (no result) /HPF
GLUCOSE SERPL-MCNC: 193 MG/DL (ref 74–106)
HCT VFR BLD CALC: 36.7 % (ref 35.4–49)
HGB BLD-MCNC: 12.5 GM/DL (ref 11.7–16.9)
KETONES UR QL STRIP: (no result)
LEUKOCYTE ESTERASE UR QL STRIP.AUTO: (no result)
MCH RBC QN AUTO: 30.5 PG (ref 25.7–33.7)
MCHC RBC AUTO-ENTMCNC: 34.1 G/DL (ref 32–35.9)
MCV RBC: 89.5 FL (ref 80–96)
MUCOUS THREADS URNS QL MICRO: (no result)
NITRITE UR QL STRIP: NEGATIVE
PH UR: 7 [PH] (ref 5–8)
PLATELET # BLD AUTO: 200 K/MM3 (ref 134–434)
PMV BLD: 8.7 FL (ref 7.5–11.1)
POTASSIUM SERPLBLD-SCNC: 4.2 MMOL/L (ref 3.5–5.1)
PROT SERPL-MCNC: 6.1 G/DL (ref 6.4–8.2)
PROT UR QL STRIP: (no result)
PROT UR QL STRIP: (no result)
RBC # BLD AUTO: 4.1 M/MM3 (ref 4–5.6)
RBC # UR STRIP: (no result) /UL
SODIUM SERPL-SCNC: 136 MMOL/L (ref 136–145)
SP GR UR: 1.02 (ref 1–1.03)
UROBILINOGEN UR STRIP-MCNC: 2 MG/DL (ref 0.2–1)
WBC # BLD AUTO: 9.5 K/MM3 (ref 4–10)

## 2018-04-18 RX ADMIN — DOCUSATE SODIUM SCH MG: 100 CAPSULE, LIQUID FILLED ORAL at 13:50

## 2018-04-18 RX ADMIN — CEFAZOLIN SCH MLS/HR: 1 INJECTION, POWDER, FOR SOLUTION INTRAVENOUS at 13:50

## 2018-04-18 RX ADMIN — DOCUSATE SODIUM SCH MG: 100 CAPSULE, LIQUID FILLED ORAL at 06:29

## 2018-04-18 RX ADMIN — SODIUM CHLORIDE SCH MLS/HR: 9 INJECTION, SOLUTION INTRAVENOUS at 06:40

## 2018-04-18 RX ADMIN — SITAGLIPTIN SCH MG: 100 TABLET, FILM COATED ORAL at 06:28

## 2018-04-18 RX ADMIN — MIRTAZAPINE SCH MG: 15 TABLET, FILM COATED ORAL at 20:59

## 2018-04-18 RX ADMIN — PHENAZOPYRIDINE SCH MG: 100 TABLET ORAL at 21:00

## 2018-04-18 RX ADMIN — HEPARIN SODIUM SCH UNIT: 5000 INJECTION, SOLUTION INTRAVENOUS; SUBCUTANEOUS at 21:00

## 2018-04-18 RX ADMIN — INSULIN ASPART SCH UNIT: 100 INJECTION, SOLUTION INTRAVENOUS; SUBCUTANEOUS at 16:57

## 2018-04-18 RX ADMIN — INSULIN ASPART SCH UNIT: 100 INJECTION, SOLUTION INTRAVENOUS; SUBCUTANEOUS at 11:53

## 2018-04-18 RX ADMIN — DOCUSATE SODIUM SCH MG: 100 CAPSULE, LIQUID FILLED ORAL at 21:44

## 2018-04-18 RX ADMIN — INSULIN ASPART SCH UNITS: 100 INJECTION, SOLUTION INTRAVENOUS; SUBCUTANEOUS at 21:20

## 2018-04-18 RX ADMIN — SENNOSIDES SCH TAB: 8.6 TABLET, FILM COATED ORAL at 20:59

## 2018-04-18 RX ADMIN — CEFAZOLIN SCH MLS/HR: 1 INJECTION, POWDER, FOR SOLUTION INTRAVENOUS at 17:54

## 2018-04-18 RX ADMIN — HEPARIN SODIUM SCH UNIT: 5000 INJECTION, SOLUTION INTRAVENOUS; SUBCUTANEOUS at 06:29

## 2018-04-18 RX ADMIN — INSULIN ASPART SCH UNIT: 100 INJECTION, SOLUTION INTRAVENOUS; SUBCUTANEOUS at 06:30

## 2018-04-18 RX ADMIN — DOXAZOSIN MESYLATE SCH MG: 4 TABLET ORAL at 21:44

## 2018-04-18 RX ADMIN — CEFAZOLIN SCH MLS/HR: 1 INJECTION, POWDER, FOR SOLUTION INTRAVENOUS at 01:25

## 2018-04-18 RX ADMIN — POLYETHYLENE GLYCOL 3350 SCH GM: 17 POWDER, FOR SOLUTION ORAL at 10:03

## 2018-04-18 RX ADMIN — LISINOPRIL SCH MG: 5 TABLET ORAL at 10:03

## 2018-04-18 RX ADMIN — PHENAZOPYRIDINE SCH MG: 100 TABLET ORAL at 13:50

## 2018-04-18 RX ADMIN — HEPARIN SODIUM SCH UNIT: 5000 INJECTION, SOLUTION INTRAVENOUS; SUBCUTANEOUS at 13:50

## 2018-04-18 NOTE — PN
Progress Note, Physician


Chief Complaint: 





BLLE weakness


History of Present Illness: 





NAD, in bed, family at bedside


Operative Date: 04/17/18


Pre-Operative Diagnosis: cervical radiculopathy


Operation: C3-C7 cervical laminectomies, with fusion


Post-Operative Diagnosis: Same as Pre-op


Surgeon: Gucci Monk


Assistant: Teodora Fournier


Anesthesiologist/CRNA: Valentin Lang


Anesthesia: General


Estimated Blood Loss (mls): 350


Drains & Tubes with Location: j/p drain at right cervical paravertebral area


Fluid Volume Replaced (mls): 1,100


Operative Report Dictated: Yes








POD #1





a little anxious, BP elevated, tachycardiac- wants to rest, has been on 

antianxiety meds in the past, but stopped 3 months ago because he felt better.





- Current Medication List


Current Medications: 


Active Medications





Docusate Sodium (Colace -)  100 mg PO TID [facility]


   Last Admin: 04/18/18 06:29 Dose:  100 mg


Doxazosin Mesylate (Cardura -)  8 mg PO HS [facility]


   Last Admin: 04/17/18 21:11 Dose:  8 mg


Fentanyl (Sublimaze Injection -)  50 mcg IVPUSH Q5M PRN


   PRN Reason: PAIN-PACU ORDER X 4 DOSES ONLY


Heparin Sodium (Porcine) (Heparin -)  5,000 unit SQ TID [facility]


   Last Admin: 04/18/18 06:29 Dose:  5,000 unit


Hydromorphone HCl (Dilaudid Pca -)  10 mg PCA PCA [facility]


   PRN Reason: Protocol


   Stop: 04/24/18 14:35


Cefazolin Sodium 1 gm/ (Dextrose)  50 mls @ 100 mls/hr IVPB Q8H-IV [facility]


   Last Admin: 04/18/18 01:25 Dose:  100 mls/hr


Sodium Chloride (Normal Saline -)  1,000 mls @ 75 mls/hr IV ASDIR [facility]


   Last Admin: 04/18/18 06:40 Dose:  75 mls/hr


Insulin Aspart (Novolog Vial Sliding Scale -)  1 vial SQ HS [facility]


   PRN Reason: Protocol


   Last Admin: 04/17/18 21:12 Dose:  2 units


Insulin Aspart (Novolog Vial Sliding Scale -)  1 vial SQ TIDAC [facility]


   PRN Reason: Protocol


   Last Admin: 04/18/18 06:30 Dose:  2 unit


Lisinopril (Prinivil)  5 mg PO DAILY [facility]


   Last Admin: 04/18/18 10:03 Dose:  5 mg


Ondansetron HCl (Zofran Injection)  4 mg IVPUSH Q6H PRN


   PRN Reason: NAUSEA AND/OR VOMITING


Ondansetron HCl (Zofran Injection)  4 mg IVPUSH Q4H PRN


   PRN Reason: NAUSEA AND/OR VOMITING


Phenazopyridine HCl (Pyridium -)  100 mg PO TID [facility]


Polyethylene Glycol (Miralax (For Daily Use) -)  17 gm PO DAILY [facility]


   Last Admin: 04/18/18 10:03 Dose:  17 gm


Promethazine HCl (Phenergan Injection -)  12.5 mg IVPB Q6H PRN


   PRN Reason: NAUSEA AND/OR VOMITING


Senna (Senna -)  2 tab PO HS [facility]


   Last Admin: 04/17/18 21:11 Dose:  2 tab


Sitagliptin Phosphate (Januvia -)  100 mg PO DAILY@0700 [facility]


   Last Admin: 04/18/18 06:28 Dose:  100 mg











- Objective


Vital Signs: 


 Vital Signs











Temperature  98.6 F   04/18/18 09:00


 


Pulse Rate  86   04/18/18 09:00


 


Respiratory Rate  20   04/18/18 09:00


 


Blood Pressure  158/62   04/18/18 09:00


 


O2 Sat by Pulse Oximetry (%)  99   04/17/18 21:00











Constitutional: Yes: Well Nourished, No Distress, Calm


Cardiovascular: Yes: Regular Rate and Rhythm


Respiratory: Yes: Regular


Gastrointestinal: Yes: Normal Bowel Sounds, Soft


Musculoskeletal: Yes: Back Pain


Extremities: Yes: WNL


Edema: No


Peripheral Pulses WNL: Yes


Neurological: Yes: Alert, Oriented, Paresthesia (BLLE-improved)


Psychiatric: Yes: Alert, Oriented


Labs: 


 CBC, BMP





 04/18/18 08:15 





 04/18/18 08:15 





 INR, PTT











INR  0.92  (0.82-1.09)   04/09/18  19:15    














Problem List





- Problems


(1) Diabetes


Assessment/Plan: 


-diabetic diet


-hold metformin during hospital stay


-continue Januvia


-BGM


-A1c at 8.0


Code(s): E11.9 - TYPE 2 DIABETES MELLITUS WITHOUT COMPLICATIONS   


Qualifiers: 


   Diabetes mellitus type: type 2 





(2) Bilateral leg paresthesia


Assessment/Plan: 


-improving


-neurology consult


-S/P laminectomy POD#1





Code(s): R20.2 - PARESTHESIA OF SKIN   





(3) S/P laminectomy


Assessment/Plan: 


Operative Date: 04/17/18


Pre-Operative Diagnosis: cervical radiculopathy


Operation: C3-C7 cervical laminectomies, with fusion


Post-Operative Diagnosis: Same as Pre-op


Surgeon: Gucci Monk


Assistant: Teodora Fournier


Anesthesiologist/CRNA: Valentin Lang


Anesthesia: General


Estimated Blood Loss (mls): 350


Drains & Tubes with Location: j/p drain at right cervical paravertebral area


Fluid Volume Replaced (mls): 1,100


Operative Report Dictated: Yes








On PCA pump for pain management, continue


Code(s): Z98.890 - OTHER SPECIFIED POSTPROCEDURAL STATES   





(4) Spinal stenosis


Code(s): M48.00 - SPINAL STENOSIS, SITE UNSPECIFIED   





(5) Anxiety


Assessment/Plan: 


-start remeron 7.5 mg po HS





Code(s): F41.9 - ANXIETY DISORDER, UNSPECIFIED   





(6) HTN (hypertension)


Assessment/Plan: 


-on Lisinopril


-cardura


-d/c IVF, Tolerating PO intake


-would tweak medications if needed


Code(s): I10 - ESSENTIAL (PRIMARY) HYPERTENSION   





Assessment/Plan





see problem list

## 2018-04-18 NOTE — PN
Progress Note (short form)





- Note


Progress Note: 





surgery





POD #1 C3-C7 laminectomies with fusion





Patient seen and examined at bedside. Patient currently with PCA and says pain 

is controlled. Per nursing staff, He was able to void last night post op but 

then had retention overnight which a bladder scan revealed 900ml and straight 

cath produced >750ml and an indwelling cordero was placed. The patient has a 

known history of BPH and retention which has been managed by his urologist but 

he does not recall the doctor's name. He has been tolerating his diet but is 

having some mild discomfort with swallowing which is appropriate to his status. 

He denies any CP, SOB, N/V, Fever or chills. 





                                                                               

                                              CBC, BMP





 04/18/18 08:15 





 04/18/18 08:15 








 Vital Signs











Temp  98.6 F   04/18/18 09:00


 


Pulse  86   04/18/18 09:00


 


Resp  20   04/18/18 09:00


 


BP  158/62   04/18/18 09:00


 


Pulse Ox  99   04/18/18 09:00








 Intake & Output











 04/17/18 04/18/18 04/18/18





 23:59 11:59 23:59


 


Intake Total 700  


 


Output Total 775 1635 


 


Balance -75 -1635 


 


Intake:   


 


    


 


  Oral 300  


 


Output:   


 


  Drainage 50 160 


 


    Posterior Neck 30 160 


 


  Urine 375 1475 


 


    Cordero  1475 


 


    Void 300  


 


  Estimated Blood Loss 350  


 


Other:   


 


  Voiding Method Urinal Indwelling Catheter 


 


  # Unmeasured Voids   


 


    Void 0  


 


  Bowel Movement No  


 


  # Bowel Movements 0  








PE:


A&O x 3, NAD


unlabored resp on RA


c-spine. Dressing c/d/i with no evidence or d/c, surrounding tissue intact with 

no tracking erythema or edema. J/P at right paraveterbral area in good position 

with ss d/c, c-collar in place in good position


B/L UE 5/5  and resisted wrist extension. sensation to gross touch  intact, 

well perfused distally


B/L LE compartments soft, supple and non-tender with SCDs in place and +2 pedal 

pulses. +dorsi/plantar flexion











Problem List





- Problems


(1) Urinary retention


Assessment/Plan: 


POD #1 Multiple level cervical laminectomies and fusion with urinary retention 

in DM patient with known BPH





1) Consult Dr VanessaDd-[id number]-sqcjftd (order in and called his office) to evaluate 

for neurogenic bladder vs infxn vs other etiology


2) Pyridum 100mg TID


3) Follow up on U/A and CX


4) continue DVT prophylaxis with b/l scds and SQ heparin


5) c-collar 23 hours/day may remove to shower


6) OOB with PT and up to chair for meals





Code(s): R33.9 - RETENTION OF URINE, UNSPECIFIED   





(2) Spinal stenosis


Code(s): M48.00 - SPINAL STENOSIS, SITE UNSPECIFIED

## 2018-04-18 NOTE — PN
Progress Note (short form)





- Note


Progress Note: 





Anesthesia POD#1


S/P C3-C7 Laminectomy with Posterior Decompression under GA and Dilaudid PCA


VSS,no N/V pain is not well controlled.Difficulty in Swallowing.


Still needs PCA


A/P


Continue PCA for today.





Reshma Hoyt MD.

## 2018-04-19 LAB
ALBUMIN SERPL-MCNC: 3 G/DL (ref 3.4–5)
ALP SERPL-CCNC: 52 U/L (ref 45–117)
ALT SERPL-CCNC: 16 U/L (ref 12–78)
ANION GAP SERPL CALC-SCNC: 6 MMOL/L (ref 8–16)
AST SERPL-CCNC: 14 U/L (ref 15–37)
BILIRUB SERPL-MCNC: 0.7 MG/DL (ref 0.2–1)
BUN SERPL-MCNC: 13 MG/DL (ref 7–18)
CALCIUM SERPL-MCNC: 8.2 MG/DL (ref 8.5–10.1)
CHLORIDE SERPL-SCNC: 102 MMOL/L (ref 98–107)
CO2 SERPL-SCNC: 26 MMOL/L (ref 21–32)
CREAT SERPL-MCNC: 0.8 MG/DL (ref 0.7–1.3)
DEPRECATED RDW RBC AUTO: 14.1 % (ref 11.9–15.9)
GLUCOSE SERPL-MCNC: 181 MG/DL (ref 74–106)
HCT VFR BLD CALC: 35.6 % (ref 35.4–49)
HGB BLD-MCNC: 12.3 GM/DL (ref 11.7–16.9)
MCH RBC QN AUTO: 30.3 PG (ref 25.7–33.7)
MCHC RBC AUTO-ENTMCNC: 34.4 G/DL (ref 32–35.9)
MCV RBC: 88.2 FL (ref 80–96)
PLATELET # BLD AUTO: 206 K/MM3 (ref 134–434)
PMV BLD: 8 FL (ref 7.5–11.1)
POTASSIUM SERPLBLD-SCNC: 4.1 MMOL/L (ref 3.5–5.1)
PROT SERPL-MCNC: 5.7 G/DL (ref 6.4–8.2)
RBC # BLD AUTO: 4.04 M/MM3 (ref 4–5.6)
SODIUM SERPL-SCNC: 134 MMOL/L (ref 136–145)
WBC # BLD AUTO: 9.7 K/MM3 (ref 4–10)

## 2018-04-19 RX ADMIN — HEPARIN SODIUM SCH UNIT: 5000 INJECTION, SOLUTION INTRAVENOUS; SUBCUTANEOUS at 05:17

## 2018-04-19 RX ADMIN — SENNOSIDES SCH TAB: 8.6 TABLET, FILM COATED ORAL at 20:59

## 2018-04-19 RX ADMIN — LISINOPRIL SCH MG: 5 TABLET ORAL at 10:05

## 2018-04-19 RX ADMIN — PHENAZOPYRIDINE SCH MG: 100 TABLET ORAL at 21:00

## 2018-04-19 RX ADMIN — INSULIN ASPART SCH: 100 INJECTION, SOLUTION INTRAVENOUS; SUBCUTANEOUS at 21:00

## 2018-04-19 RX ADMIN — PHENAZOPYRIDINE SCH MG: 100 TABLET ORAL at 13:57

## 2018-04-19 RX ADMIN — CEFTRIAXONE SODIUM SCH MLS/HR: 2 INJECTION, POWDER, FOR SOLUTION INTRAMUSCULAR; INTRAVENOUS at 17:49

## 2018-04-19 RX ADMIN — PHENAZOPYRIDINE SCH MG: 100 TABLET ORAL at 05:19

## 2018-04-19 RX ADMIN — CEFAZOLIN SCH MLS/HR: 1 INJECTION, POWDER, FOR SOLUTION INTRAVENOUS at 01:01

## 2018-04-19 RX ADMIN — DOCUSATE SODIUM SCH MG: 100 CAPSULE, LIQUID FILLED ORAL at 13:56

## 2018-04-19 RX ADMIN — INSULIN ASPART SCH UNIT: 100 INJECTION, SOLUTION INTRAVENOUS; SUBCUTANEOUS at 16:54

## 2018-04-19 RX ADMIN — CEFAZOLIN SCH MLS/HR: 1 INJECTION, POWDER, FOR SOLUTION INTRAVENOUS at 10:04

## 2018-04-19 RX ADMIN — SODIUM CHLORIDE SCH MLS/HR: 9 INJECTION, SOLUTION INTRAVENOUS at 01:15

## 2018-04-19 RX ADMIN — HEPARIN SODIUM SCH UNIT: 5000 INJECTION, SOLUTION INTRAVENOUS; SUBCUTANEOUS at 13:56

## 2018-04-19 RX ADMIN — HEPARIN SODIUM SCH UNIT: 5000 INJECTION, SOLUTION INTRAVENOUS; SUBCUTANEOUS at 21:00

## 2018-04-19 RX ADMIN — MIRTAZAPINE SCH MG: 15 TABLET, FILM COATED ORAL at 20:59

## 2018-04-19 RX ADMIN — DOCUSATE SODIUM SCH MG: 100 CAPSULE, LIQUID FILLED ORAL at 05:17

## 2018-04-19 RX ADMIN — INSULIN ASPART SCH UNIT: 100 INJECTION, SOLUTION INTRAVENOUS; SUBCUTANEOUS at 06:32

## 2018-04-19 RX ADMIN — DOCUSATE SODIUM SCH MG: 100 CAPSULE, LIQUID FILLED ORAL at 20:59

## 2018-04-19 RX ADMIN — INSULIN ASPART SCH UNIT: 100 INJECTION, SOLUTION INTRAVENOUS; SUBCUTANEOUS at 11:44

## 2018-04-19 RX ADMIN — SITAGLIPTIN SCH MG: 100 TABLET, FILM COATED ORAL at 06:32

## 2018-04-19 RX ADMIN — POLYETHYLENE GLYCOL 3350 SCH GM: 17 POWDER, FOR SOLUTION ORAL at 10:05

## 2018-04-19 NOTE — PN
Progress Note (short form)





- Note


Progress Note: 





Anesthesia Post op/Pain


Pt seen and examined


S:alert and awake comfortable


O:


 Vital Signs











Temperature  98.7 F   04/19/18 09:23


 


Pulse Rate  96 H  04/19/18 09:23


 


Respiratory Rate  18   04/19/18 09:23


 


Blood Pressure  132/67   04/19/18 09:23


 


O2 Sat by Pulse Oximetry (%)  99   04/18/18 20:35








 CBC, BMP





 04/19/18 06:40 





 04/19/18 06:40 








A/P:


Current Active Problems





Anxiety (Acute)


Bilateral leg paresthesia (Acute)


Diabetes (Acute)


HTN (hypertension) (Acute)


S/P laminectomy (Acute)


Spinal stenosis (Acute)


Urinary retention (Acute)


Doing well post op


Continue PCA


Continue current care


Justin Torres MD

## 2018-04-19 NOTE — PN
Progress Note (short form)





- Note


Progress Note: 





surgery





POD #2 C3-C7 laminectomies with fusion





Patient seen and examined at bedside with his daughter who is present for 

entire physical exam. The daughter states he has been slightly confused this 

morning which she feels could be related to lack of sleep combined with the 

pain medication. Patient still has PCA and says pain is controlled. Family at 

bedside attempting to confirm who his urologist is. Still awaiting urology 

consult now with gil.  He has been tolerating his diet but is having some 

mild discomfort with swallowing which is appropriate to his status. He denies 

any CP, SOB, N/V, Fever or chills. 


 


                                                                               

       Vital Signs











Temp  99.9 F H  04/19/18 06:00


 


Pulse  96 H  04/19/18 06:00


 


Resp  18   04/19/18 06:00


 


BP  128/68   04/19/18 06:00


 


Pulse Ox  99   04/18/18 20:35








 Intake & Output











 04/18/18 04/18/18 04/19/18





 11:59 23:59 11:59


 


Intake Total  1200 450


 


Output Total 1635 840 300


 


Balance -1635 360 150


 


Intake:   


 


  IV  750 


 


    Normal Saline - 1,000 ml  750 





    @ 75 mls/hr IV ASDIR [facility]   





    Rx#:[id number]   


 


  IVPB  100 450


 


  Oral  350 


 


Output:   


 


  Drainage 160 90 


 


    Posterior Neck 160 90 


 


  Urine 1475 750 300


 


    Cordero 1475 750 300


 


Other:   


 


  Voiding Method Indwelling Catheter Indwelling Catheter 


 


  Bowel Movement  No 


 


  # Bowel Movements  0 








 CBC, BMP





 04/19/18 06:40 


urine culture pending


U/A with positive findings





PE:


A&O X2 (does not know year)  NAD, slightly lethargic this morning


unlabored resp on RA


c-spine. Dressing c/d/i with no evidence or d/c, surrounding tissue intact with 

no tracking erythema or edema. J/P at right paraveterbral area in good position 

with ss d/c, c-collar in place in good position


B/L UE 5/5  and resisted wrist extension. sensation to gross touch  intact, 

well perfused distally


B/L LE compartments soft, supple and non-tender with SCDs in place and +2 pedal 

pulses. +dorsi/plantar flexion











Problem List





- Problems


(1) Urinary retention


Assessment/Plan: 


POD #2 Multiple level cervical laminectomies and fusion with urinary retention 

and UTI in DM patient with known BPH. Slightly confused this morning will 

address Urology issues and continue to observe closely. Will re-evaluate mental 

status again later today.





1) Consult Dr VanessaGf-Vsuxqrl-tsrikly (order in and called his office) to evaluate 

for neurogenic bladder vs infxn vs other etiology- maintain cordero


2) Follow up on Urine CX


3) ID consult for UTI management


4) will re-evaluate mental status later today and closely observe


5) continue DVT prophylaxis with b/l scds and SQ heparin


6) c-collar 23 hours/day may remove to shower- pediatric collar ordered


7) OOB with PT and up to chair for meals





Code(s): R33.9 - RETENTION OF URINE, UNSPECIFIED   





(2) Spinal stenosis


Code(s): M48.00 - SPINAL STENOSIS, SITE UNSPECIFIED

## 2018-04-19 NOTE — PN
Progress Note, Physician


Chief Complaint: 





patient seen and examined


s/p C3-C7 laminectomy and fusion


20cc from YAAKOV drain emptied right now


300cc urine output


awaiting urology consult regarding urinary retention


urine culture pending





- Current Medication List


Current Medications: 


Active Medications





Docusate Sodium (Colace -)  100 mg PO TID [facility]


   Last Admin: 04/19/18 05:17 Dose:  100 mg


Doxazosin Mesylate (Cardura -)  8 mg PO [facility]


   Last Admin: 04/18/18 21:44 Dose:  8 mg


Heparin Sodium (Porcine) (Heparin -)  5,000 unit SQ TID [facility]


   Last Admin: 04/19/18 05:17 Dose:  5,000 unit


Hydromorphone HCl (Dilaudid Pca -)  10 mg PCA PCA [facility]


   PRN Reason: Protocol


   Last Admin: 04/19/18 09:00 Dose:  Not Given


Cefazolin Sodium 1 gm/ (Dextrose)  50 mls @ 100 mls/hr IVPB Q8H-IV [facility]


   Last Admin: 04/19/18 10:04 Dose:  100 mls/hr


Sodium Chloride (Normal Saline -)  1,000 mls @ 40 mls/hr IV ASDIR [facility]


   Last Admin: 04/19/18 01:15 Dose:  40 mls/hr


Insulin Aspart (Novolog Vial Sliding Scale -)  1 vial SQ [facility]


   PRN Reason: Protocol


   Last Admin: 04/18/18 21:20 Dose:  2 units


Insulin Aspart (Novolog Vial Sliding Scale -)  1 vial SQ TIDAC [facility]


   PRN Reason: Protocol


   Last Admin: 04/19/18 11:44 Dose:  2 unit


Lisinopril (Prinivil)  5 mg PO DAILY [facility]


   Last Admin: 04/19/18 10:05 Dose:  5 mg


Mirtazapine (Remeron -)  7.5 mg PO [facility]


   Last Admin: 04/18/18 20:59 Dose:  7.5 mg


Ondansetron HCl (Zofran Injection)  4 mg IVPUSH Q6H PRN


   PRN Reason: NAUSEA AND/OR VOMITING


Ondansetron HCl (Zofran Injection)  4 mg IVPUSH Q4H PRN


   PRN Reason: NAUSEA AND/OR VOMITING


Phenazopyridine HCl (Pyridium -)  100 mg PO TID [facility]


   Last Admin: 04/19/18 05:19 Dose:  100 mg


Polyethylene Glycol (Miralax (For Daily Use) -)  17 gm PO DAILY [facility]


   Last Admin: 04/19/18 10:05 Dose:  17 gm


Promethazine HCl (Phenergan Injection -)  12.5 mg IVPB Q6H PRN


   PRN Reason: NAUSEA AND/OR VOMITING


Senna (Senna -)  2 tab PO HS [facility]


   Last Admin: 04/18/18 20:59 Dose:  2 tab


Sitagliptin Phosphate (Januvia -)  100 mg PO DAILY@0700 [facility]


   Last Admin: 04/19/18 06:32 Dose:  100 mg











- Objective


Vital Signs: 


 Vital Signs











Temperature  98.7 F   04/19/18 09:23


 


Pulse Rate  96 H  04/19/18 09:23


 


Respiratory Rate  18   04/19/18 09:23


 


Blood Pressure  132/67   04/19/18 09:23


 


O2 Sat by Pulse Oximetry (%)  95   04/19/18 09:00











Constitutional: Yes: Calm


Neck: Yes: Other (neck collar  yaakov drain with serosangionous discharge)


Cardiovascular: Yes: Regular Rate and Rhythm, S1, S2


Respiratory: Yes: CTA Bilaterally


Gastrointestinal: Yes: Normal Bowel Sounds, Soft


Genitourinary: Yes: Cordero Present


Edema: No


Neurological: Yes: Alert, Oriented


Labs: 


 CBC, BMP





 04/19/18 06:40 





 04/19/18 06:40 





 INR, PTT











INR  0.92  (0.82-1.09)   04/09/18  19:15    














Problem List





- Problems


(1) Urinary retention


Assessment/Plan: 


urology consult


pyridium


ancef


cordero re inserted for urinary retention ? neurogenic bladder


Code(s): R33.9 - RETENTION OF URINE, UNSPECIFIED   





(2) Bilateral leg paresthesia


Assessment/Plan: 


s/p C3-C7 laminectomy and fusion


soft collar 23 hr a day


may remove for shower


OOB to chair


PCA pump for pain control


stool softners and miralax


Code(s): R20.2 - PARESTHESIA OF SKIN   





(3) Diabetes


Assessment/Plan: 


hgba1c 8.0


januvia 100mg po daily increased januvia 50 mg


Code(s): E11.9 - TYPE 2 DIABETES MELLITUS WITHOUT COMPLICATIONS   


Qualifiers: 


   Diabetes mellitus type: type 2 





(4) Spinal stenosis


Assessment/Plan: 


ARSLAN on board


    


Code(s): M48.00 - SPINAL STENOSIS, SITE UNSPECIFIED

## 2018-04-19 NOTE — PN
Progress Note (short form)





- Note


Progress Note: 





ID Consult dictated


POD # 2 C3C7 laminectomy


Acute urinary retention


UTI


Toxic metabolic encephalopathy


Obtain blood c/s


Empiric ceftriaxone 2gm q24h

## 2018-04-20 LAB
ALBUMIN SERPL-MCNC: 3 G/DL (ref 3.4–5)
ALP SERPL-CCNC: 53 U/L (ref 45–117)
ALT SERPL-CCNC: 14 U/L (ref 12–78)
ANION GAP SERPL CALC-SCNC: 9 MMOL/L (ref 8–16)
AST SERPL-CCNC: 16 U/L (ref 15–37)
BILIRUB SERPL-MCNC: 0.6 MG/DL (ref 0.2–1)
BUN SERPL-MCNC: 11 MG/DL (ref 7–18)
CALCIUM SERPL-MCNC: 8.6 MG/DL (ref 8.5–10.1)
CHLORIDE SERPL-SCNC: 101 MMOL/L (ref 98–107)
CO2 SERPL-SCNC: 26 MMOL/L (ref 21–32)
CREAT SERPL-MCNC: 0.7 MG/DL (ref 0.7–1.3)
DEPRECATED RDW RBC AUTO: 14 % (ref 11.9–15.9)
GLUCOSE SERPL-MCNC: 132 MG/DL (ref 74–106)
HCT VFR BLD CALC: 35.7 % (ref 35.4–49)
HGB BLD-MCNC: 12.3 GM/DL (ref 11.7–16.9)
MCH RBC QN AUTO: 30.9 PG (ref 25.7–33.7)
MCHC RBC AUTO-ENTMCNC: 34.5 G/DL (ref 32–35.9)
MCV RBC: 89.5 FL (ref 80–96)
PLATELET # BLD AUTO: 219 K/MM3 (ref 134–434)
PMV BLD: 8.4 FL (ref 7.5–11.1)
POTASSIUM SERPLBLD-SCNC: 4 MMOL/L (ref 3.5–5.1)
PROT SERPL-MCNC: 6 G/DL (ref 6.4–8.2)
RBC # BLD AUTO: 3.99 M/MM3 (ref 4–5.6)
SODIUM SERPL-SCNC: 136 MMOL/L (ref 136–145)
WBC # BLD AUTO: 8.6 K/MM3 (ref 4–10)

## 2018-04-20 RX ADMIN — MIRTAZAPINE SCH MG: 15 TABLET, FILM COATED ORAL at 21:50

## 2018-04-20 RX ADMIN — HEPARIN SODIUM SCH UNIT: 5000 INJECTION, SOLUTION INTRAVENOUS; SUBCUTANEOUS at 13:46

## 2018-04-20 RX ADMIN — INSULIN ASPART SCH UNIT: 100 INJECTION, SOLUTION INTRAVENOUS; SUBCUTANEOUS at 17:21

## 2018-04-20 RX ADMIN — INSULIN ASPART SCH UNIT: 100 INJECTION, SOLUTION INTRAVENOUS; SUBCUTANEOUS at 11:34

## 2018-04-20 RX ADMIN — SENNOSIDES SCH TAB: 8.6 TABLET, FILM COATED ORAL at 21:50

## 2018-04-20 RX ADMIN — DOCUSATE SODIUM SCH MG: 100 CAPSULE, LIQUID FILLED ORAL at 13:46

## 2018-04-20 RX ADMIN — SITAGLIPTIN SCH MG: 100 TABLET, FILM COATED ORAL at 06:34

## 2018-04-20 RX ADMIN — TAMSULOSIN HYDROCHLORIDE SCH MG: 0.4 CAPSULE ORAL at 08:30

## 2018-04-20 RX ADMIN — LISINOPRIL SCH MG: 5 TABLET ORAL at 09:36

## 2018-04-20 RX ADMIN — INSULIN ASPART SCH UNITS: 100 INJECTION, SOLUTION INTRAVENOUS; SUBCUTANEOUS at 21:56

## 2018-04-20 RX ADMIN — PHENAZOPYRIDINE SCH MG: 100 TABLET ORAL at 21:51

## 2018-04-20 RX ADMIN — HEPARIN SODIUM SCH UNIT: 5000 INJECTION, SOLUTION INTRAVENOUS; SUBCUTANEOUS at 21:50

## 2018-04-20 RX ADMIN — DOCUSATE SODIUM SCH MG: 100 CAPSULE, LIQUID FILLED ORAL at 05:59

## 2018-04-20 RX ADMIN — SODIUM CHLORIDE SCH: 9 INJECTION, SOLUTION INTRAVENOUS at 00:20

## 2018-04-20 RX ADMIN — CEFTRIAXONE SODIUM SCH MLS/HR: 2 INJECTION, POWDER, FOR SOLUTION INTRAMUSCULAR; INTRAVENOUS at 09:36

## 2018-04-20 RX ADMIN — PHENAZOPYRIDINE SCH MG: 100 TABLET ORAL at 06:00

## 2018-04-20 RX ADMIN — INSULIN ASPART SCH UNIT: 100 INJECTION, SOLUTION INTRAVENOUS; SUBCUTANEOUS at 06:34

## 2018-04-20 RX ADMIN — SODIUM CHLORIDE SCH MLS/HR: 9 INJECTION, SOLUTION INTRAVENOUS at 06:06

## 2018-04-20 RX ADMIN — HEPARIN SODIUM SCH UNIT: 5000 INJECTION, SOLUTION INTRAVENOUS; SUBCUTANEOUS at 05:59

## 2018-04-20 RX ADMIN — PHENAZOPYRIDINE SCH MG: 100 TABLET ORAL at 13:46

## 2018-04-20 RX ADMIN — POLYETHYLENE GLYCOL 3350 SCH GM: 17 POWDER, FOR SOLUTION ORAL at 09:36

## 2018-04-20 RX ADMIN — DOCUSATE SODIUM SCH MG: 100 CAPSULE, LIQUID FILLED ORAL at 21:52

## 2018-04-20 NOTE — CONS
DATE OF CONSULTATION:  04/19/2018

 

The patient is a 77-year-old male postoperative cervical laminectomy, evaluated for

urinary tract infection.  He was admitted to the hospital on April 12, 2018, with

increasing bilateral lower extremity numbness and weakness.  He was found to have

cervical spine stenosis.  Patient underwent a C3-C7 cervical laminectomy on April 17, 2018.  He is presently postoperative day number two.  His postoperative course has

been complicated by acute urinary retention, low grade temperature, and altered

mentation.  A Mathis catheter was placed and over 700 mL of urine was obtained.  Urine

in the Mathis collection bag is hemorrhagic.  At the present time, he is awake and

responsive.  He offers no focal complaint.  Denies any recent dysuria or hematuria. 

No complaints of suprapubic or flank pain.  

 

PAST MEDICAL HISTORY:  Positive for diabetes mellitus, hypertension, BPH.  

 

ALLERGIES:  No known allergies. 

 

MEDICATIONS: Cardura, lisinopril, Janumet.

 

SOCIAL HISTORY:  Originally from [facility].  He has been living in the [place] for years, as per family.  Nonsmoker, nondrinker.  Traveled back to [place] in

September of 2017.  No recent hospitalizations.  

 

LABORATORY DATA:  White count 9.7, hematocrit 35.6, platelet count 206, creatinine

0.8, sedimentation rate of 7.  Urinalysis:  53 white cells, 3+ leukocyte esterase.

 

PHYSICAL EXAMINATION:   

General:  He is awake and alert.  He is in no acute distress.

Vital Signs: Temperature max 99.9, blood pressure 116/78, pulse 99 and regular,

respirations 20 per minute.

HEENT:  Sclerae are anicteric.  Cervical spine wound has no drainage noted. 

Heart:  Sounds S1, S2.

Lungs:  Clear.  

Abdomen:  Soft.  No suprapubic or flank tenderness. 

Extremities:  Negative for edema.  

 

IMPRESSION:  

1.  Postoperative day number 2, C3-C7 laminectomy.

2.  Acute urinary retention.

3.  Urinary tract infection, possible sepsis, secondary to urinary tract infection.  

4.  Rule out toxic metabolic encephalopathy.  

 

In light of pyuria, low grade fever, and acute urinary retention, we will obtain

blood cultures and start ceftriaxone 2 g IV piggy back daily for coverage of

community-acquired urinary tract pathogens.  Await culture results.  Case discussed

with family members present at the time of examination. 

 

Thank you for the kind referral. 

 

 

 

LUIGI POWER M.D.

 

[id number]

DD: 04/19/2018 16:24

DT: 04/20/2018 01:20

Job #:  [id number]

## 2018-04-20 NOTE — PN
Progress Note (short form)





- Note


Progress Note: 





surgery





POD #3 C3-C7 laminectomies with fusion





Patient seen and examined at bedside. Patient states pain is controlled 

currently on PCA but nursing reports he is not using PCA.  He has been 

tolerating his diet and swallowing discomfort has improved. He denies any CP, 

SOB, N/V, Fever or chills. He has been OOB ambulating.





 Vital Signs











Temp  98.8 F   04/20/18 05:45


 


Pulse  95 H  04/20/18 05:45


 


Resp  20   04/20/18 05:45


 


BP  162/90   04/20/18 05:45


 


Pulse Ox  95   04/19/18 20:29








 Intake & Output











 04/19/18 04/19/18 04/20/18





 11:59 23:59 11:59


 


Intake Total 500 1080 480


 


Output Total 300 1200 640


 


Balance 200 -120 -160


 


Intake:   


 


  IV  480 480


 


    Normal Saline - 1,000 ml  480 480





    @ 40 mls/hr IV ASDIR CHARLY   





    Rx#:[id number]   


 


  IVPB 500 100 


 


  Oral  500 


 


Output:   


 


  Drainage   40


 


    Posterior Neck   40


 


  Urine 300 1200 600


 


    Cordero 300 1200 


 


    Void   600


 


Other:   


 


  Voiding Method Indwelling Catheter Indwelling Catheter 


 


  Bowel Movement  No No


 


  # Bowel Movements  0 











 CBC, BMP





 04/20/18 07:50 





 


                                                                               

       


Blood cx


urine culture pending








PE:


Alert and orientated. resting comfortably 


unlabored resp on RA


c-spine. incision c/d/i with staples insitu and no evidence or d/c or collection

, surrounding tissue intact with no tracking erythema or edema. J/P at right 

paraveterbral area removed with tip fully intact.drain site clean and dry. c-

collar in place in good position


B/L UE 5/5  and resisted wrist extension. sensation to gross touch  intact, 

well perfused distally


B/L LE compartments soft, supple and non-tender with SCDs in place and +2 pedal 

pulses. +dorsi/plantar flexion











Problem List





- Problems


(1) Urinary retention


Assessment/Plan: 


POD #3 c-spine fusion with urinary retention, UTI, currently with cordero, 

Cardura changed to flomax.





Plan:


1) follow up on Urine and blood cultures


2) DVT prophylaxis with scds and SQ heparin


3) d/c PAC start oral meds


4) Continue ABX per ID


5) Trial of void tomorrow pre Urology/medicine recommendation


6) d/c planning for rehab


Code(s): R33.9 - RETENTION OF URINE, UNSPECIFIED   





(2) Spinal stenosis


Code(s): M48.00 - SPINAL STENOSIS, SITE UNSPECIFIED

## 2018-04-20 NOTE — PN
Progress Note, Physician





- Current Medication List


Current Medications: 


Active Medications





Docusate Sodium (Colace -)  100 mg PO TID [facility]


   Last Admin: 04/20/18 05:59 Dose:  100 mg


Heparin Sodium (Porcine) (Heparin -)  5,000 unit SQ TID [facility]


   Last Admin: 04/20/18 05:59 Dose:  5,000 unit


Hydromorphone HCl (Dilaudid Pca -)  10 mg PCA PCA [facility]


   PRN Reason: Protocol


   Last Admin: 04/19/18 09:00 Dose:  Not Given


Sodium Chloride (Normal Saline -)  1,000 mls @ 40 mls/hr IV ASDIR [facility]


   Last Admin: 04/20/18 06:06 Dose:  40 mls/hr


Ceftriaxone Sodium 2 gm/ (Dextrose)  100 mls @ 200 mls/hr IVPB DAILY [facility]


   Last Admin: 04/19/18 17:49 Dose:  200 mls/hr


Insulin Aspart (Novolog Vial Sliding Scale -)  1 vial SQ HS [facility]


   PRN Reason: Protocol


   Last Admin: 04/19/18 21:00 Dose:  Not Given


Insulin Aspart (Novolog Vial Sliding Scale -)  1 vial SQ TIDAC [facility]


   PRN Reason: Protocol


   Last Admin: 04/20/18 06:34 Dose:  2 unit


Lisinopril (Prinivil)  5 mg PO DAILY [facility]


   Last Admin: 04/19/18 10:05 Dose:  5 mg


Mirtazapine (Remeron -)  7.5 mg PO [facility]


   Last Admin: 04/19/18 20:59 Dose:  7.5 mg


Ondansetron HCl (Zofran Injection)  4 mg IVPUSH Q6H PRN


   PRN Reason: NAUSEA AND/OR VOMITING


Ondansetron HCl (Zofran Injection)  4 mg IVPUSH Q4H PRN


   PRN Reason: NAUSEA AND/OR VOMITING


Phenazopyridine HCl (Pyridium -)  100 mg PO TID [facility]


   Last Admin: 04/20/18 06:00 Dose:  100 mg


Polyethylene Glycol (Miralax (For Daily Use) -)  17 gm PO DAILY [facility]


   Last Admin: 04/19/18 10:05 Dose:  17 gm


Promethazine HCl (Phenergan Injection -)  12.5 mg IVPB Q6H PRN


   PRN Reason: NAUSEA AND/OR VOMITING


Senna (Senna -)  2 tab PO [facility]


   Last Admin: 04/19/18 20:59 Dose:  2 tab


Sitagliptin Phosphate (Januvia -)  100 mg PO DAILY@0700 [facility]


   Last Admin: 04/20/18 06:34 Dose:  100 mg


Tamsulosin HCl (Flomax -)  0.4 mg PO DAILY@0830 [facility]











- Objective


Vital Signs: 


 Vital Signs











Temperature  98.8 F   04/20/18 05:45


 


Pulse Rate  95 H  04/20/18 05:45


 


Respiratory Rate  20   04/20/18 05:45


 


Blood Pressure  162/90   04/20/18 05:45


 


O2 Sat by Pulse Oximetry (%)  95   04/19/18 20:29











Cardiovascular: Yes: S1, S2


Respiratory: Yes: Regular, CTA Bilaterally


Gastrointestinal: Yes: Normal Bowel Sounds, Soft


Labs: 


 CBC, BMP





 04/20/18 07:50 





 INR, PTT











INR  0.92  (0.82-1.09)   04/09/18  19:15    














Problem List





- Problems


(1) Bilateral leg paresthesia


Code(s): R20.2 - PARESTHESIA OF SKIN   





(2) Diabetes


Code(s): E11.9 - TYPE 2 DIABETES MELLITUS WITHOUT COMPLICATIONS   


Qualifiers: 


   Diabetes mellitus type: type 2 





(3) Spinal stenosis


Code(s): M48.00 - SPINAL STENOSIS, SITE UNSPECIFIED   





Assessment/Plan





- Problems


(1) Urinary retention


Assessment/Plan: 


urology consult


pyridium


ancef


cordero re inserted for urinary retention ? neurogenic bladder


Code(s): R33.9 - RETENTION OF URINE, UNSPECIFIED   





(2) Bilateral leg paresthesia


Assessment/Plan: 


s/p C3-C7 laminectomy and fusion


soft collar 23 hr a day


may remove for shower


OOB to chair


PCA pump for pain control


stool softners and miralax


Code(s): R20.2 - PARESTHESIA OF SKIN   





(3) Diabetes


Assessment/Plan: 


hgba1c 8.0


januvia 100mg po daily increased januvia 50 mg


Code(s): E11.9 - TYPE 2 DIABETES MELLITUS WITHOUT COMPLICATIONS   


Qualifiers: 


   Diabetes mellitus type: type 2 





(4) Spinal stenosis


Assessment/Plan: 


ARSLAN on board


    


Code(s): M48.00 - SPINAL STENOSIS, SITE UNSPECIFIED

## 2018-04-20 NOTE — PN
Progress Note (short form)





- Note


Progress Note: 





feels well


oob with neck collar





 Vital Signs











 Period  Temp  Pulse  Resp  BP Sys/Cervantes  Pulse Ox


 


 Last 24 Hr  97.3 F-99.3 F    20-20  128-162/73-90  95-95








cor-rrr


lungs clear


abd soft,nt


Cordero 


ext no edema





 CBC, BMP





 04/20/18 07:50 





 04/20/18 07:50 





a/p


urinary retention-still with cordero


s/p cervical laminectomy 4/17


would continue ceftriaxone for now

## 2018-04-20 NOTE — PN
Progress Note (short form)





- Note


Progress Note: 





Post op day#3.Patient stable and has pain score of 2-3/10.PCA dc todayn.Patient 

onprn pain medication doing fine.No any anesthesia related problem.Patient Dc 

from the anesthesia care.

## 2018-04-21 LAB
ALBUMIN SERPL-MCNC: 2.8 G/DL (ref 3.4–5)
ALP SERPL-CCNC: 52 U/L (ref 45–117)
ALT SERPL-CCNC: 15 U/L (ref 12–78)
ANION GAP SERPL CALC-SCNC: 10 MMOL/L (ref 8–16)
AST SERPL-CCNC: 14 U/L (ref 15–37)
BILIRUB SERPL-MCNC: 0.6 MG/DL (ref 0.2–1)
BUN SERPL-MCNC: 12 MG/DL (ref 7–18)
CALCIUM SERPL-MCNC: 8.6 MG/DL (ref 8.5–10.1)
CHLORIDE SERPL-SCNC: 98 MMOL/L (ref 98–107)
CO2 SERPL-SCNC: 26 MMOL/L (ref 21–32)
CREAT SERPL-MCNC: 0.8 MG/DL (ref 0.7–1.3)
DEPRECATED RDW RBC AUTO: 13.7 % (ref 11.9–15.9)
GLUCOSE SERPL-MCNC: 221 MG/DL (ref 74–106)
HCT VFR BLD CALC: 34.3 % (ref 35.4–49)
HGB BLD-MCNC: 11.7 GM/DL (ref 11.7–16.9)
MCH RBC QN AUTO: 30.6 PG (ref 25.7–33.7)
MCHC RBC AUTO-ENTMCNC: 34.2 G/DL (ref 32–35.9)
MCV RBC: 89.3 FL (ref 80–96)
PLATELET # BLD AUTO: 249 K/MM3 (ref 134–434)
PMV BLD: 8.3 FL (ref 7.5–11.1)
POTASSIUM SERPLBLD-SCNC: 4.3 MMOL/L (ref 3.5–5.1)
PROT SERPL-MCNC: 5.8 G/DL (ref 6.4–8.2)
RBC # BLD AUTO: 3.84 M/MM3 (ref 4–5.6)
SODIUM SERPL-SCNC: 134 MMOL/L (ref 136–145)
WBC # BLD AUTO: 8.5 K/MM3 (ref 4–10)

## 2018-04-21 RX ADMIN — LISINOPRIL SCH MG: 5 TABLET ORAL at 09:16

## 2018-04-21 RX ADMIN — SITAGLIPTIN SCH MG: 100 TABLET, FILM COATED ORAL at 06:48

## 2018-04-21 RX ADMIN — DOCUSATE SODIUM SCH MG: 100 CAPSULE, LIQUID FILLED ORAL at 14:31

## 2018-04-21 RX ADMIN — HEPARIN SODIUM SCH UNIT: 5000 INJECTION, SOLUTION INTRAVENOUS; SUBCUTANEOUS at 14:32

## 2018-04-21 RX ADMIN — PHENAZOPYRIDINE SCH MG: 100 TABLET ORAL at 14:31

## 2018-04-21 RX ADMIN — MIRTAZAPINE SCH MG: 15 TABLET, FILM COATED ORAL at 21:27

## 2018-04-21 RX ADMIN — INSULIN ASPART SCH UNIT: 100 INJECTION, SOLUTION INTRAVENOUS; SUBCUTANEOUS at 06:48

## 2018-04-21 RX ADMIN — SENNOSIDES SCH TAB: 8.6 TABLET, FILM COATED ORAL at 21:27

## 2018-04-21 RX ADMIN — CEFTRIAXONE SODIUM SCH MLS/HR: 2 INJECTION, POWDER, FOR SOLUTION INTRAMUSCULAR; INTRAVENOUS at 09:16

## 2018-04-21 RX ADMIN — DOCUSATE SODIUM SCH MG: 100 CAPSULE, LIQUID FILLED ORAL at 21:27

## 2018-04-21 RX ADMIN — HEPARIN SODIUM SCH UNIT: 5000 INJECTION, SOLUTION INTRAVENOUS; SUBCUTANEOUS at 21:27

## 2018-04-21 RX ADMIN — INSULIN ASPART SCH UNITS: 100 INJECTION, SOLUTION INTRAVENOUS; SUBCUTANEOUS at 11:52

## 2018-04-21 RX ADMIN — TAMSULOSIN HYDROCHLORIDE SCH MG: 0.4 CAPSULE ORAL at 09:16

## 2018-04-21 RX ADMIN — PHENAZOPYRIDINE SCH MG: 100 TABLET ORAL at 06:47

## 2018-04-21 RX ADMIN — INSULIN ASPART SCH UNIT: 100 INJECTION, SOLUTION INTRAVENOUS; SUBCUTANEOUS at 17:13

## 2018-04-21 RX ADMIN — INSULIN ASPART SCH UNITS: 100 INJECTION, SOLUTION INTRAVENOUS; SUBCUTANEOUS at 21:26

## 2018-04-21 RX ADMIN — POLYETHYLENE GLYCOL 3350 SCH GM: 17 POWDER, FOR SOLUTION ORAL at 09:16

## 2018-04-21 RX ADMIN — HEPARIN SODIUM SCH UNIT: 5000 INJECTION, SOLUTION INTRAVENOUS; SUBCUTANEOUS at 06:48

## 2018-04-21 RX ADMIN — DOCUSATE SODIUM SCH MG: 100 CAPSULE, LIQUID FILLED ORAL at 06:48

## 2018-04-21 RX ADMIN — PHENAZOPYRIDINE SCH MG: 100 TABLET ORAL at 21:27

## 2018-04-21 RX ADMIN — INSULIN ASPART SCH UNIT: 100 INJECTION, SOLUTION INTRAVENOUS; SUBCUTANEOUS at 11:30

## 2018-04-21 NOTE — PN
Progress Note, Physician


Chief Complaint: 





S/P CERVICAL LAMINECTOMY


HOFFMAN IN PLACE FOR URINARY RETENTION


EATING , NO DISTRESS





- Current Medication List


Current Medications: 


Active Medications





Acetaminophen (Tylenol -)  650 mg PO Q4H PRN


   PRN Reason: FEVER


Docusate Sodium (Colace -)  [facility] mg PO [facility]ID [facility]


   Last Admin: 04/21/18 14:31 Dose:  100 mg


Heparin Sodium (Porcine) (Heparin -)  5,000 unit SQ TID [facility]


   Last Admin: 04/21/18 14:32 Dose:  5,000 unit


Sodium Chloride (Normal Saline -)  1,000 mls @ 40 mls/hr IV ASDIR [facility]


   Last Admin: 04/20/18 06:06 Dose:  40 mls/hr


Ceftriaxone Sodium 2 gm/ (Dextrose)  100 mls @ 200 mls/hr IVPB DAILY [facility]


   Last Admin: 04/21/18 09:16 Dose:  200 mls/hr


Insulin Aspart (Novolog Vial Sliding Scale -)  1 vial SQ HS [facility]


   PRN Reason: Protocol


   Last Admin: 04/21/18 11:52 Dose:  4 units


Insulin Aspart (Novolog Vial Sliding Scale -)  1 vial SQ [facility]


   PRN Reason: Protocol


   Last Admin: 04/21/18 11:30 Dose:  4 unit


Lisinopril (Prinivil)  5 mg PO DAILY [facility]


   Last Admin: 04/21/18 09:16 Dose:  5 mg


Mirtazapine (Remeron -)  7.5 mg PO HS [facility]


   Last Admin: 04/20/18 21:50 Dose:  7.5 mg


Ondansetron HCl (Zofran Injection)  4 mg IVPUSH Q6H PRN


   PRN Reason: NAUSEA AND/OR VOMITING


Ondansetron HCl (Zofran Injection)  4 mg IVPUSH Q4H PRN


   PRN Reason: NAUSEA AND/OR VOMITING


Oxycodone HCl (Roxicodone -)  5 mg PO Q6H PRN


   PRN Reason: PAIN LEVEL 1-5


Oxycodone HCl (Roxicodone -)  10 mg PO Q6H PRN


   PRN Reason: PAIN LEVEL 6-10


Phenazopyridine HCl (Pyridium -)  100 mg PO TID [facility]


   Last Admin: 04/21/18 14:31 Dose:  100 mg


Polyethylene Glycol (Miralax (For Daily Use) -)  17 gm PO DAILY [facility]


   Last Admin: 04/21/18 09:16 Dose:  17 gm


Promethazine HCl (Phenergan Injection -)  12.5 mg IVPB Q6H PRN


   PRN Reason: NAUSEA AND/OR VOMITING


Senna (Senna -)  2 tab PO HS [facility]


   Last Admin: 04/20/18 21:50 Dose:  2 tab


Sitagliptin Phosphate (Januvia -)  100 mg PO DAILY@0700 [facility]


   Last Admin: 04/21/18 06:48 Dose:  100 mg


Tamsulosin HCl (Flomax -)  0.4 mg PO DAILY@0830 [facility]


   Last Admin: 04/21/18 09:16 Dose:  0.4 mg











- Objective


Vital Signs: 


 Vital Signs











Temperature  98.8 F   04/21/18 13:50


 


Pulse Rate  98 H  04/21/18 13:50


 


Respiratory Rate  18   04/21/18 13:50


 


Blood Pressure  135/70   04/21/18 13:50


 


O2 Sat by Pulse Oximetry (%)  95   04/21/18 08:45











Constitutional: Yes: No Distress


Eyes: Yes: WNL


HENT: Yes: WNL


Neck: Yes: Decreased ROM, Other


Cardiovascular: Yes: WNL


Respiratory: Yes: WNL


Gastrointestinal: Yes: WNL


Genitourinary: Yes: Hoffman Present


Musculoskeletal: Yes: Muscle Weakness


Extremities: Yes: WNL


Edema: No


Peripheral Pulses WNL: Yes


Integumentary: Yes: WNL


Wound/Incision: Yes: Clean/Dry


Neurological: Yes: Pre-Existing Deficit


...Motor Strength: LLE, RLE


Psychiatric: Yes: Other


Labs: 


 CBC, BMP





 04/21/18 07:35 





 04/21/18 07:35 





 INR, PTT











INR  0.92  (0.82-1.09)   04/09/18  19:15    














Problem List





- Problems


(1) Anxiety


Code(s): F41.9 - ANXIETY DISORDER, UNSPECIFIED   





(2) Diabetes


Code(s): E11.9 - TYPE 2 DIABETES MELLITUS WITHOUT COMPLICATIONS   


Qualifiers: 


   Diabetes mellitus type: type 2 





(3) S/P laminectomy


Code(s): Z98.890 - OTHER SPECIFIED POSTPROCEDURAL STATES   





(4) Spinal stenosis


Code(s): M48.00 - SPINAL STENOSIS, SITE UNSPECIFIED   





(5) Urinary retention


Code(s): R33.9 - RETENTION OF URINE, UNSPECIFIED   





Assessment/Plan





NEUROSURGERY F/U


CAN DC HOFFMAN CATH


MONITOR OUTPUT


ON FLOMAX


ON ABX


OOB TO CHAIR

## 2018-04-22 RX ADMIN — PHENAZOPYRIDINE SCH MG: 100 TABLET ORAL at 06:26

## 2018-04-22 RX ADMIN — INSULIN ASPART SCH UNIT: 100 INJECTION, SOLUTION INTRAVENOUS; SUBCUTANEOUS at 11:42

## 2018-04-22 RX ADMIN — SENNOSIDES SCH TAB: 8.6 TABLET, FILM COATED ORAL at 21:45

## 2018-04-22 RX ADMIN — TAMSULOSIN HYDROCHLORIDE SCH MG: 0.4 CAPSULE ORAL at 09:19

## 2018-04-22 RX ADMIN — HEPARIN SODIUM SCH UNIT: 5000 INJECTION, SOLUTION INTRAVENOUS; SUBCUTANEOUS at 21:45

## 2018-04-22 RX ADMIN — HEPARIN SODIUM SCH UNIT: 5000 INJECTION, SOLUTION INTRAVENOUS; SUBCUTANEOUS at 06:16

## 2018-04-22 RX ADMIN — INSULIN ASPART SCH UNIT: 100 INJECTION, SOLUTION INTRAVENOUS; SUBCUTANEOUS at 17:00

## 2018-04-22 RX ADMIN — POLYETHYLENE GLYCOL 3350 SCH GM: 17 POWDER, FOR SOLUTION ORAL at 09:20

## 2018-04-22 RX ADMIN — INSULIN ASPART SCH UNIT: 100 INJECTION, SOLUTION INTRAVENOUS; SUBCUTANEOUS at 06:17

## 2018-04-22 RX ADMIN — DOCUSATE SODIUM SCH MG: 100 CAPSULE, LIQUID FILLED ORAL at 06:16

## 2018-04-22 RX ADMIN — LISINOPRIL SCH MG: 5 TABLET ORAL at 09:19

## 2018-04-22 RX ADMIN — SITAGLIPTIN SCH MG: 100 TABLET, FILM COATED ORAL at 06:16

## 2018-04-22 RX ADMIN — DOCUSATE SODIUM SCH MG: 100 CAPSULE, LIQUID FILLED ORAL at 14:55

## 2018-04-22 RX ADMIN — MIRTAZAPINE SCH MG: 15 TABLET, FILM COATED ORAL at 21:45

## 2018-04-22 RX ADMIN — INSULIN ASPART SCH UNITS: 100 INJECTION, SOLUTION INTRAVENOUS; SUBCUTANEOUS at 21:46

## 2018-04-22 RX ADMIN — SODIUM CHLORIDE SCH MLS/HR: 9 INJECTION, SOLUTION INTRAVENOUS at 06:26

## 2018-04-22 RX ADMIN — PHENAZOPYRIDINE SCH MG: 100 TABLET ORAL at 21:46

## 2018-04-22 RX ADMIN — PHENAZOPYRIDINE SCH MG: 100 TABLET ORAL at 14:56

## 2018-04-22 RX ADMIN — HEPARIN SODIUM SCH UNIT: 5000 INJECTION, SOLUTION INTRAVENOUS; SUBCUTANEOUS at 14:55

## 2018-04-22 RX ADMIN — CEFTRIAXONE SODIUM SCH MLS/HR: 2 INJECTION, POWDER, FOR SOLUTION INTRAMUSCULAR; INTRAVENOUS at 09:19

## 2018-04-22 RX ADMIN — DOCUSATE SODIUM SCH MG: 100 CAPSULE, LIQUID FILLED ORAL at 21:45

## 2018-04-22 NOTE — PN
Progress Note (short form)





- Note


Progress Note: 





HOFFMAN DISCONTINUED, PATIENT URINATING ON HIS OWN


+ BOWEL MOVEMENT


COMFORTABLE


NO FEVERS


PE


RRR, S1S2


CLEAR BS


ABD SOFT NT


PLAN


CAN DC HOME


F/U WITH HIS PMD


URINE CX NEGATIVE


VOIDING URINE WITH FLOMAX CONTINUE





Problem List





- Problems


(1) Anxiety


Code(s): F41.9 - ANXIETY DISORDER, UNSPECIFIED   





(2) Diabetes


Code(s): E11.9 - TYPE 2 DIABETES MELLITUS WITHOUT COMPLICATIONS   


Qualifiers: 


   Diabetes mellitus type: type 2 





(3) S/P laminectomy


Code(s): Z98.890 - OTHER SPECIFIED POSTPROCEDURAL STATES   





(4) Spinal stenosis


Code(s): M48.00 - SPINAL STENOSIS, SITE UNSPECIFIED   





(5) Urinary retention


Code(s): R33.9 - RETENTION OF URINE, UNSPECIFIED

## 2018-04-22 NOTE — PN
Progress Note (short form)





- Note


Progress Note: 





Patient making good progress after cervical decompression and fusion. Drain is 

out. Wound is clean, dry and intact. Incisional pain is well controlled. 

Patient with improved balance and gait with assistance.








Agree with plans for discharge as described by Dr. Urbano. Will plan skin clip 

removal in 2 weeks.

## 2018-04-23 VITALS — DIASTOLIC BLOOD PRESSURE: 66 MMHG | HEART RATE: 70 BPM | TEMPERATURE: 98.1 F | SYSTOLIC BLOOD PRESSURE: 130 MMHG

## 2018-04-23 RX ADMIN — LISINOPRIL SCH MG: 5 TABLET ORAL at 09:25

## 2018-04-23 RX ADMIN — PHENAZOPYRIDINE SCH MG: 100 TABLET ORAL at 06:28

## 2018-04-23 RX ADMIN — HEPARIN SODIUM SCH UNIT: 5000 INJECTION, SOLUTION INTRAVENOUS; SUBCUTANEOUS at 06:28

## 2018-04-23 RX ADMIN — POLYETHYLENE GLYCOL 3350 SCH GM: 17 POWDER, FOR SOLUTION ORAL at 09:26

## 2018-04-23 RX ADMIN — DOCUSATE SODIUM SCH MG: 100 CAPSULE, LIQUID FILLED ORAL at 06:28

## 2018-04-23 RX ADMIN — INSULIN ASPART SCH UNIT: 100 INJECTION, SOLUTION INTRAVENOUS; SUBCUTANEOUS at 12:24

## 2018-04-23 RX ADMIN — CEFTRIAXONE SODIUM SCH MLS/HR: 2 INJECTION, POWDER, FOR SOLUTION INTRAMUSCULAR; INTRAVENOUS at 09:25

## 2018-04-23 RX ADMIN — INSULIN ASPART SCH UNIT: 100 INJECTION, SOLUTION INTRAVENOUS; SUBCUTANEOUS at 06:32

## 2018-04-23 RX ADMIN — SITAGLIPTIN SCH MG: 100 TABLET, FILM COATED ORAL at 06:28

## 2018-04-23 RX ADMIN — TAMSULOSIN HYDROCHLORIDE SCH MG: 0.4 CAPSULE ORAL at 09:25

## 2018-04-23 RX ADMIN — SODIUM CHLORIDE SCH: 9 INJECTION, SOLUTION INTRAVENOUS at 00:22

## 2018-04-23 NOTE — DS
Physical Examination


Vital Signs: 


 Vital Signs











Temperature  98.1 F   04/23/18 08:00


 


Pulse Rate  70   04/23/18 08:00


 


Respiratory Rate  18   04/23/18 08:00


 


Blood Pressure  130/66   04/23/18 08:00


 


O2 Sat by Pulse Oximetry (%)  95   04/22/18 20:16











Constitutional: Yes: Well Nourished, No Distress, Calm


Cardiovascular: Yes: Regular Rate and Rhythm


Respiratory: Yes: Regular


Gastrointestinal: Yes: Normal Bowel Sounds, Soft


Musculoskeletal: Yes: Muscle Weakness


Extremities: Yes: WNL


Edema: No


Peripheral Pulses WNL: Yes


Neurological: Yes: Alert, Oriented


Psychiatric: Yes: Alert, Oriented


Labs: 


 CBC, BMP





 04/21/18 07:35 





 04/21/18 07:35 











Discharge Summary


Reason For Visit: PARESTHESIA OF BOTH LOWER EXTREMITIES


Current Active Problems





Anxiety (Acute)


Bilateral leg paresthesia (Acute)


Diabetes (Acute)


HTN (hypertension) (Acute)


S/P laminectomy (Acute)


Spinal stenosis (Acute)


Urinary retention (Acute)








Hospital Course: 





This is a 77 year old male with a past medical history of DM, HTN, BPH 

presented to the ED with sudden onset left leg numbness 2 days ago and right 

leg numbness since sitting in the ED this evening. He has difficulty walking 

due to weakness. Prior to Saturday he was feeling fine. Denies bowel or bladder 

incontinence. Reports chronic constipation





Operative Date: 04/17/18


Pre-Operative Diagnosis: cervical radiculopathy


Operation: C3-C7 cervical laminectomies, with fusion


Post-Operative Diagnosis: Same as Pre-op


Surgeon: Gucci Monk


Assistant: Teodora Fournier


Anesthesiologist/CRNA: Valentin Lang


Anesthesia: General


Estimated Blood Loss (mls): 350


Drains & Tubes with Location: j/p drain at right cervical paravertebral area


Condition: Stable





- Instructions


Diet, Activity, Other Instructions: 


Dr. Monk Discharge Instructions








Post Operative Instructions





Physical activity


Resume your normal everyday activity as tolerated no heavy lifting or exercise 

until seen by your surgeon. You may walk unlimited amounts of and climb stairs. 

Do not resume driving the car until cleared by your surgeon. Wear your c-collar 

for 23 hours/day only removing to shower.  No pillows under head/neck until 

cleared by surgeon.





Wound care


You can shower but do not submerge the incisions. Keep incisions covered with 

large tegaderm when showering, after,  remove tegaderm and apply new clean dry 

dressings. Do not apply any ointments or lotions to incisions. 





Diet


There are no dietary restrictions. Eat healthy, high-fiber foods. Drink 6 to 8 

glasses of liquid each day. This will assist in keeping your bowels are regular.





Pain management


You may take Tylenol or acetaminophen  for pain.  Any pain prescription 

medication ordered should be taken as prescribed for moderate to severe pain.





Call Dr. Monk for any of the following:





Severe pain not relieved by medication


New or worsening symptoms 


Fever of 101 or higher


Excessive bleeding or drainage on dressing


Inability to urinate


 


If you experience chest pain or shortness of breath seek emergency treatment 

immediately.








Call the office to confirm your post operative appointment for 7 - 10 days post 

op.














CBC/CMP IN 1 WEEK


FOLLOW UP WITH UROLOGY IN 1 MONTH


Referrals: 


Bassam Eric MD [Primary Care Provider] - 


Gucci Monk MD, FAANS [Staff Physician] - 


Disposition: SKILLED NURSING FACILITY





- Home Medications


Comprehensive Discharge Medication List: 


Ambulatory Orders





Doxazosin Mesylate [Cardura -] 8 mg PO HS 04/10/18 


Lisinopril 5 mg PO DAILY 04/10/18 


Sitagliptin Phos/Metformin HCl [Janumet 50-1,000 mg Tablet] 1 each PO DAILY 04/

10/18

## 2023-05-16 NOTE — PN
Progress Note, Physician


History of Present Illness: 





c/o weakness and numbness of legs





- Current Medication List


Current Medications: 


Active Medications





Doxazosin Mesylate (Cardura -)  8 mg PO HS [facility]


   Last Admin: 04/11/18 21:13 Dose:  8 mg


Heparin Sodium (Porcine) (Heparin -)  5,000 unit SQ BID [facility]


   Last Admin: 04/11/18 21:16 Dose:  5,000 unit


Insulin Aspart (Novolog Vial Sliding Scale -)  0 vial SQ HS [facility]


   PRN Reason: Protocol


   Last Admin: 04/11/18 21:13 Dose:  Not Given


Insulin Aspart (Novolog Vial Sliding Scale -)  0 vial SQ TIDAC [facility]


   PRN Reason: Protocol


   Last Admin: 04/12/18 06:21 Dose:  2 units


Lisinopril (Prinivil)  5 mg PO DAILY [facility]


   Last Admin: 04/11/18 09:40 Dose:  5 mg


Polyethylene Glycol (Miralax (For Daily Use) -)  17 gm PO DAILY [facility]


   Last Admin: 04/11/18 09:41 Dose:  17 gm


Sitagliptin Phosphate (Januvia -)  50 mg PO DAILY@0700 [facility]


   Last Admin: 04/12/18 06:20 Dose:  50 mg











- Objective


Vital Signs: 


 Vital Signs











Temperature  97.9 F   04/12/18 06:35


 


Pulse Rate  71   04/12/18 06:35


 


Respiratory Rate  20   04/12/18 06:35


 


Blood Pressure  135/69   04/12/18 06:35


 


O2 Sat by Pulse Oximetry (%)  97   04/12/18 02:00











Cardiovascular: Yes: Regular Rate and Rhythm


Respiratory: Yes: Regular, CTA Bilaterally


Gastrointestinal: Yes: Normal Bowel Sounds, Soft


Neurological: Yes: Numbness, Weakness


Labs: 


 CBC, BMP





 04/11/18 08:00 





 04/11/18 08:00 





 INR, PTT











INR  0.92  (0.82-1.09)   04/09/18  19:15    














Problem List





- Problems


(1) Bilateral leg paresthesia


Assessment/Plan: 


-Neuro consult noted


-r/o spinal lesion


-MRI-pending


_emg noted


-pt eval


-dvt prophylaxis


Code(s): R20.2 - PARESTHESIA OF SKIN   





(2) Diabetes


Assessment/Plan: 


-dems


-bgm


Code(s): E11.9 - TYPE 2 DIABETES MELLITUS WITHOUT COMPLICATIONS Dressing (No Sutures): dry sterile dressing